# Patient Record
Sex: FEMALE | Race: WHITE | NOT HISPANIC OR LATINO | Employment: FULL TIME | URBAN - METROPOLITAN AREA
[De-identification: names, ages, dates, MRNs, and addresses within clinical notes are randomized per-mention and may not be internally consistent; named-entity substitution may affect disease eponyms.]

---

## 2023-07-26 ENCOUNTER — OFFICE VISIT (OUTPATIENT)
Dept: PHYSICAL MEDICINE AND REHAB | Facility: CLINIC | Age: 53
End: 2023-07-26
Payer: COMMERCIAL

## 2023-07-26 VITALS — SYSTOLIC BLOOD PRESSURE: 141 MMHG | HEART RATE: 78 BPM | DIASTOLIC BLOOD PRESSURE: 93 MMHG

## 2023-07-26 DIAGNOSIS — S06.0XAA CONCUSSION WITH UNKNOWN LOSS OF CONSCIOUSNESS STATUS, INITIAL ENCOUNTER: Primary | ICD-10-CM

## 2023-07-26 DIAGNOSIS — Z01.30 BLOOD PRESSURE CHECK: ICD-10-CM

## 2023-07-26 PROCEDURE — 99417 PROLNG OP E/M EACH 15 MIN: CPT | Performed by: PHYSICAL MEDICINE & REHABILITATION

## 2023-07-26 PROCEDURE — 99205 OFFICE O/P NEW HI 60 MIN: CPT | Performed by: PHYSICAL MEDICINE & REHABILITATION

## 2023-07-26 RX ORDER — PAROXETINE 30 MG/1
30 TABLET, FILM COATED ORAL
COMMUNITY
Start: 2023-06-27 | End: 2023-10-27
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
## 2023-08-07 ENCOUNTER — OFFICE VISIT (OUTPATIENT)
Dept: FAMILY MEDICINE | Facility: CLINIC | Age: 53
End: 2023-08-07
Payer: COMMERCIAL

## 2023-08-07 ENCOUNTER — ANCILLARY PROCEDURE (OUTPATIENT)
Dept: GENERAL RADIOLOGY | Facility: CLINIC | Age: 53
End: 2023-08-07
Attending: NURSE PRACTITIONER
Payer: COMMERCIAL

## 2023-08-07 VITALS
SYSTOLIC BLOOD PRESSURE: 130 MMHG | TEMPERATURE: 98.9 F | OXYGEN SATURATION: 96 % | DIASTOLIC BLOOD PRESSURE: 87 MMHG | HEART RATE: 73 BPM | BODY MASS INDEX: 34.99 KG/M2 | WEIGHT: 210 LBS | HEIGHT: 65 IN

## 2023-08-07 DIAGNOSIS — M89.8X6 PAIN IN LEFT TIBIA: ICD-10-CM

## 2023-08-07 DIAGNOSIS — L08.9 LOCAL INFECTION OF SKIN AND SUBCUTANEOUS TISSUE: Primary | ICD-10-CM

## 2023-08-07 PROCEDURE — 73590 X-RAY EXAM OF LOWER LEG: CPT | Mod: LT | Performed by: RADIOLOGY

## 2023-08-07 RX ORDER — DOXYCYCLINE HYCLATE 100 MG
100 TABLET ORAL 2 TIMES DAILY
Qty: 20 TABLET | Refills: 0 | Status: SHIPPED | OUTPATIENT
Start: 2023-08-07 | End: 2023-08-17

## 2023-08-07 ASSESSMENT — ENCOUNTER SYMPTOMS
CHILLS: 0
FEVER: 0
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
## 2023-08-08 ENCOUNTER — VIRTUAL VISIT (OUTPATIENT)
Dept: PHYSICAL MEDICINE AND REHAB | Facility: CLINIC | Age: 53
End: 2023-08-08
Payer: COMMERCIAL

## 2023-08-08 DIAGNOSIS — S06.0XAD CONCUSSION WITH UNKNOWN LOSS OF CONSCIOUSNESS STATUS, SUBSEQUENT ENCOUNTER: Primary | ICD-10-CM

## 2023-08-08 PROCEDURE — 99215 OFFICE O/P EST HI 40 MIN: CPT | Mod: VID | Performed by: PHYSICAL MEDICINE & REHABILITATION

## 2023-08-08 PROCEDURE — 99417 PROLNG OP E/M EACH 15 MIN: CPT | Mod: VID | Performed by: PHYSICAL MEDICINE & REHABILITATION
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
## 2023-08-08 NOTE — PROGRESS NOTES
"Virtual Visit Details    Type of service:  Video Visit     Originating Location (pt. Location): {video visit patient location:659995::\"Home\"}  {PROVIDER LOCATION On-site should be selected for visits conducted from your clinic location or adjoining [facility] hospital, academic office, or other nearby [facility] building. Off-site should be selected for all other provider locations, including home:807585}  Distant Location (provider location):  {virtual location provider:477106}  Platform used for Video Visit: {Virtual Visit Platforms:219330::\"Critical Diagnostics\"}    "

## 2023-08-08 NOTE — PROGRESS NOTES
Virtual Visit Details    Type of service:  Video Visit     Originating Location (pt. Location): Home    Distant Location (provider location):  On-site  Platform used for Video Visit: Beau

## 2023-08-08 NOTE — NURSING NOTE
Patient denies any changes since echeck-in regarding medication and allergies and states all information entered during echeck-in remains accurate.    Is the patient currently in the state of [place]? YES    Visit mode:VIDEO    If the visit is dropped, the patient can be reconnected by: VIDEO VISIT: Text to cell phone: [phone]    Will anyone else be joining the visit? NO      How would you like to obtain your AVS? MyChart    Are changes needed to the allergy or medication list? NO    Reason for visit: RECHECK       Alert and oriented to person, place and time

## 2023-09-16 ENCOUNTER — HEALTH MAINTENANCE LETTER (OUTPATIENT)
Age: 53
End: 2023-09-16

## 2023-09-24 ASSESSMENT — PATIENT HEALTH QUESTIONNAIRE - PHQ9
SUM OF ALL RESPONSES TO PHQ QUESTIONS 1-9: 4
SUM OF ALL RESPONSES TO PHQ QUESTIONS 1-9: 4
10. IF YOU CHECKED OFF ANY PROBLEMS, HOW DIFFICULT HAVE THESE PROBLEMS MADE IT FOR YOU TO DO YOUR WORK, TAKE CARE OF THINGS AT HOME, OR GET ALONG WITH OTHER PEOPLE: SOMEWHAT DIFFICULT

## 2023-09-24 ASSESSMENT — ANXIETY QUESTIONNAIRES
3. WORRYING TOO MUCH ABOUT DIFFERENT THINGS: NOT AT ALL
2. NOT BEING ABLE TO STOP OR CONTROL WORRYING: NOT AT ALL
5. BEING SO RESTLESS THAT IT IS HARD TO SIT STILL: SEVERAL DAYS
IF YOU CHECKED OFF ANY PROBLEMS ON THIS QUESTIONNAIRE, HOW DIFFICULT HAVE THESE PROBLEMS MADE IT FOR YOU TO DO YOUR WORK, TAKE CARE OF THINGS AT HOME, OR GET ALONG WITH OTHER PEOPLE: SOMEWHAT DIFFICULT
1. FEELING NERVOUS, ANXIOUS, OR ON EDGE: MORE THAN HALF THE DAYS
GAD7 TOTAL SCORE: 7
7. FEELING AFRAID AS IF SOMETHING AWFUL MIGHT HAPPEN: NOT AT ALL
4. TROUBLE RELAXING: SEVERAL DAYS
6. BECOMING EASILY ANNOYED OR IRRITABLE: NEARLY EVERY DAY
GAD7 TOTAL SCORE: 7

## 2023-09-24 ASSESSMENT — ENCOUNTER SYMPTOMS
BREAST MASS: 0
FREQUENCY: 0
JOINT SWELLING: 0
ABDOMINAL PAIN: 0
DIZZINESS: 0
MYALGIAS: 1
HEADACHES: 1
EYE PAIN: 0
PARESTHESIAS: 0
FEVER: 0
ARTHRALGIAS: 1
PALPITATIONS: 0
DIARRHEA: 0
SORE THROAT: 0
CHILLS: 0
HEMATURIA: 0
HEARTBURN: 0
SHORTNESS OF BREATH: 0
WEAKNESS: 0
NERVOUS/ANXIOUS: 1
HEMATOCHEZIA: 0
DYSURIA: 0
CONSTIPATION: 0
NAUSEA: 0
COUGH: 0

## 2023-09-25 ENCOUNTER — OFFICE VISIT (OUTPATIENT)
Dept: FAMILY MEDICINE | Facility: CLINIC | Age: 53
End: 2023-09-25
Payer: COMMERCIAL

## 2023-09-25 VITALS
HEART RATE: 73 BPM | OXYGEN SATURATION: 96 % | HEIGHT: 64 IN | BODY MASS INDEX: 35.85 KG/M2 | WEIGHT: 210 LBS | DIASTOLIC BLOOD PRESSURE: 85 MMHG | SYSTOLIC BLOOD PRESSURE: 126 MMHG | TEMPERATURE: 98.9 F

## 2023-09-25 DIAGNOSIS — F41.9 ANXIETY AND DEPRESSION: ICD-10-CM

## 2023-09-25 DIAGNOSIS — Z23 ENCOUNTER FOR IMMUNIZATION: ICD-10-CM

## 2023-09-25 DIAGNOSIS — R10.11 RUQ ABDOMINAL PAIN: ICD-10-CM

## 2023-09-25 DIAGNOSIS — Z12.31 ENCOUNTER FOR SCREENING MAMMOGRAM FOR BREAST CANCER: ICD-10-CM

## 2023-09-25 DIAGNOSIS — E66.9 OBESITY WITH BODY MASS INDEX OF 30.0-39.9: ICD-10-CM

## 2023-09-25 DIAGNOSIS — Z00.00 HEALTHCARE MAINTENANCE: Primary | ICD-10-CM

## 2023-09-25 DIAGNOSIS — H74.01 TYMPANOSCLEROSIS OF RIGHT EAR: ICD-10-CM

## 2023-09-25 DIAGNOSIS — F32.A ANXIETY AND DEPRESSION: ICD-10-CM

## 2023-09-25 ASSESSMENT — ENCOUNTER SYMPTOMS
EYE PAIN: 0
PARESTHESIAS: 0
HEARTBURN: 0
CHILLS: 0
WEAKNESS: 0
SORE THROAT: 0
HEMATOCHEZIA: 0
FEVER: 0
DIZZINESS: 0
HEMATURIA: 0
MYALGIAS: 1
ABDOMINAL PAIN: 0
NAUSEA: 0
NERVOUS/ANXIOUS: 1
COUGH: 0
BREAST MASS: 0
CONSTIPATION: 0
DIARRHEA: 0
HEADACHES: 1
PALPITATIONS: 0
JOINT SWELLING: 0
DYSURIA: 0
SHORTNESS OF BREATH: 0
FREQUENCY: 0
ARTHRALGIAS: 1

## 2023-09-25 NOTE — NURSING NOTE
"ROOM:2  KEENA RING    Preferred Name: Steve     How did you hear about us?  Other - Call Center    53 year old  Chief Complaint   Patient presents with     Physical       Blood pressure 126/85, pulse 73, temperature 98.9  F (37.2  C), temperature source Oral, height 1.631 m (5' 4.2\"), weight 95.3 kg (210 lb), SpO2 96 %. Body mass index is 35.82 kg/m .  BP completed using cuff size:        Patient Active Problem List   Diagnosis   (none) - all problems resolved or deleted       Wt Readings from Last 2 Encounters:   09/25/23 95.3 kg (210 lb)   08/07/23 95.3 kg (210 lb)     BP Readings from Last 3 Encounters:   09/25/23 126/85   08/07/23 130/87   07/26/23 (!) 141/93       Allergies   Allergen Reactions     Erythromycin Base [Erythromycin] Nausea and Vomiting     Sulfa (Sulfonamide Antibiotics) [Sulfa Antibiotics] Swelling       Current Outpatient Medications   Medication     PARoxetine (PAXIL) 30 MG tablet     No current facility-administered medications for this visit.       Social History     Tobacco Use     Smoking status: Never     Smokeless tobacco: Never       Honoring Choices - Health Care Directive Guide offered to patient at time of visit.    Health Maintenance Due   Topic Date Due     ADVANCE CARE PLANNING  Never done     MAMMO SCREENING  Never done     HEPATITIS B IMMUNIZATION (1 of 3 - 3-dose series) Never done     COLORECTAL CANCER SCREENING  Never done     HIV SCREENING  Never done     HEPATITIS C SCREENING  Never done     PAP  07/29/2012     LIPID  Never done     YEARLY PREVENTIVE VISIT  06/02/2023     INFLUENZA VACCINE (1) 09/01/2023     ZOSTER IMMUNIZATION (2 of 2) 01/17/2023       Immunization History   Administered Date(s) Administered     COVID-19 Bivalent 12+ (Pfizer) 09/27/2022     COVID-19 Monovalent 18+ (Moderna) 02/25/2021, 03/25/2021, 11/24/2021     Influenza (H1N1) 11/13/2009       No results found for: PAP    No lab results found.        8/8/2023     3:06 PM 7/26/2023     3:38 PM "   PHQ-2 ( 1999 Pfizer)   Q1: Little interest or pleasure in doing things 0 1   Q2: Feeling down, depressed or hopeless 0 0   PHQ-2 Score 0 1           9/24/2023     9:48 AM   PHQ-9 SCORE   PHQ-9 Total Score MyChart 4 (Minimal depression)   PHQ-9 Total Score 4           9/24/2023     9:49 AM   SILVIA-7 SCORE   Total Score 7 (mild anxiety)   Total Score 7            No data to display                Wilfredo Dutta    September 25, 2023 9:18 AM

## 2023-09-25 NOTE — PROGRESS NOTES
"     ANNUAL WELLNESS EXAM     Today's Date: Sep 25, 2023     Patient Steve Aggarwal 1970 presents to the clinic today for a preventative health visit.         SUBJECTIVE     History of Present Illness:      53-year-old female with past medical history NURY (on CPAP) presents for annual physical and discuss a few concerns.    Right upper quadrant pain-patient reports ongoing chronic right upper quadrant abdominal pain.  Chart review demonstrates a thorough work-up for this on 2/7/2023 through her provider at [facility].  She had labs, CT abdomen/pelvis, and a right upper quadrant ultrasound.  Ultrasound demonstrated cholelithiasis and fatty infiltration of liver.  Today, patient reports that she will have the pain daily.  She has started a \"digestive enzyme\" by her chiropractor which she thinks is helping.  She denies frequent nausea.  She reports that she never feels satiated and has a weight gain of over 15 pounds.  She has had episodes of vomiting with BMs.  She also reports chronic hemorrhoids with intermittent bleeding.  The bleeding will resolve after a few days. Her last bleeding episode was during her recent trip to [facility] last month.  She will use an over-the-counter cream for her hemorrhoid.  She has a chronic history of constipation which she manages with Metamucil.  She has believe that her GI issues could be related to stress.     Anxiety-patient currently on Paxil 30 mg.  She has been on this medication for a long time and feels that helps her anxiety however she still feels depressed. No other acute concerns/symptoms at time of exam.                  Allergies   Allergen Reactions    Erythromycin Base [Erythromycin] Nausea and Vomiting    Sulfa (Sulfonamide Antibiotics) [Sulfa Antibiotics] Swelling        Current Outpatient Medications   Medication Instructions    PARoxetine (PAXIL) 30 mg, Oral       No past medical history on file.     No family history on file.       Social History     Tobacco Use "    Smoking status: Never    Smokeless tobacco: Never        History   Sexual Activity    Sexual activity: Not on file             9/24/2023     9:48 AM   PHQ   PHQ-9 Total Score 4   Q9: Thoughts of better off dead/self-harm past 2 weeks Not at all        Immunization History   Administered Date(s) Administered    COVID-19 Bivalent 12+ (Pfizer) 09/27/2022    COVID-19 Monovalent 18+ (Moderna) 02/25/2021, 03/25/2021, 11/24/2021    Influenza (H1N1) 11/13/2009        Health Maintenance Due   Topic Date Due    ADVANCE CARE PLANNING  Never done    MAMMO SCREENING  Never done    HEPATITIS B IMMUNIZATION (1 of 3 - 3-dose series) Never done    COLORECTAL CANCER SCREENING  Never done    HIV SCREENING  Never done    HEPATITIS C SCREENING  Never done    PAP  07/29/2012    LIPID  Never done    YEARLY PREVENTIVE VISIT  06/02/2023    INFLUENZA VACCINE (1) 09/01/2023    ZOSTER IMMUNIZATION (2 of 2) 01/17/2023      Health Maintenance components reviewed -  Pap- May,11 2021- NIL  Colonoscopy- 2021- Repeat 2026  Mammogram- 11/18/2022- Negative          Review of Systems   Constitutional:  Negative for chills and fever.   HENT:  Negative for congestion, ear pain, hearing loss and sore throat.    Eyes:  Negative for pain and visual disturbance.   Respiratory:  Negative for cough and shortness of breath.    Cardiovascular:  Negative for chest pain, palpitations and peripheral edema.   Gastrointestinal:  Negative for abdominal pain, constipation, diarrhea, heartburn, hematochezia and nausea.   Breasts:  Negative for tenderness, breast mass and discharge.   Genitourinary:  Negative for dysuria, frequency, genital sores, hematuria, pelvic pain, urgency, vaginal bleeding and vaginal discharge.   Musculoskeletal:  Positive for arthralgias and myalgias. Negative for joint swelling.   Skin:  Negative for rash.   Neurological:  Positive for headaches. Negative for dizziness, weakness and paresthesias.   Psychiatric/Behavioral:  Positive for mood  "changes. The patient is nervous/anxious.    Review of Systems   Constitutional, HEENT, cardiovascular, pulmonary, gi and gu systems are negative, except as otherwise noted.           OBJECTIVE     /85   Pulse 73   Temp 98.9  F (37.2  C) (Oral)   Ht 1.631 m (5' 4.2\")   Wt 95.3 kg (210 lb)   SpO2 96%   BMI 35.82 kg/m         Estimated body mass index is 35.82 kg/m  as calculated from the following:    Height as of this encounter: 1.631 m (5' 4.2\").    Weight as of this encounter: 95.3 kg (210 lb).        Labs:  No results found for: WBC, HGB, HCT, PLT, CHOL, TRIG, HDL, LDLDIRECT, ALT, AST, NA, CREATININE, BUN, CO2, TSH, PSA, INR, GLUF, HGBA1C, MICROALBUR    Physical Exam  Exam conducted with a chaperone present.   Constitutional:       General: She is not in acute distress.     Appearance: She is not ill-appearing.   HENT:      Right Ear: Tympanic membrane is scarred.      Left Ear: Tympanic membrane normal.      Mouth/Throat:      Mouth: Mucous membranes are moist.      Pharynx: No oropharyngeal exudate.   Eyes:      Extraocular Movements: Extraocular movements intact.      Pupils: Pupils are equal, round, and reactive to light.   Cardiovascular:      Rate and Rhythm: Normal rate and regular rhythm.      Heart sounds: Normal heart sounds. No murmur heard.  Pulmonary:      Effort: Pulmonary effort is normal. No respiratory distress.      Breath sounds: Normal breath sounds. No wheezing or rales.   Chest:   Breasts:     Right: No mass.      Left: No mass.   Abdominal:      General: Bowel sounds are normal.      Palpations: Abdomen is soft.      Tenderness: There is no abdominal tenderness. Negative signs include Barone's sign.   Genitourinary:     Rectum: External hemorrhoid present.      Comments: On inspection, non-bleeding external hemorroid noted.  Musculoskeletal:      Cervical back: Neck supple.      Right lower leg: No edema.      Left lower leg: No edema.   Lymphadenopathy:      Cervical: No " cervical adenopathy.      Upper Body:      Right upper body: No axillary adenopathy.      Left upper body: No axillary adenopathy.   Skin:     General: Skin is warm.   Neurological:      General: No focal deficit present.      Mental Status: She is alert.   Psychiatric:         Thought Content: Thought content normal.         Judgment: Judgment normal.          Dinorah Wen, EMT served as chaperone.         ASSESSMENT/PLAN       1. Healthcare maintenance  Colonoscopy-repeat 2026  Pap-through OB/GYN, 2021 NIL repeat 2024.  Mammogram-due 11/2023    2. RUQ abdominal pain  59-year-old female with chronic right upper quadrant abdominal pain.  Work-up last winter demonstrated cholelithiasis and fatty infiltration of liver.  Her abdomen is currently soft, normal bowel sounds, and negative Barone sign.  She has restarted taking digestive enzymes to her chiropractor where she believes is helping.  Should her symptoms escalate, may be worth having her see general surgery discuss removal of gallbladder.    For external hemorrhoid, advised she can use over-the-counter hydrocortisone cream for for no more than 1 week.  She can also try Colace to help with constipation.  - CBC with platelets differential; Future  - Comprehensive metabolic panel; Future    3. Anxiety and depression  We discussed adding new medication versus starting therapy.  Patient opted to start therapy at this time.  - Adult Mental Health  Referral; Future    4. Encounter for screening mammogram for breast cancer  - Screening Mammogram Digital Bilateral; Future    5. Obesity with body mass index of 30.0-39.9  Her weight is stable when compared to 8/7/23. We discussed GLP-1s considering her satiety concerns.  Patient denies history of medullary thyroid cancer, multiple endocrine neoplasia syndrome type II, or pancreatitis.  We will check labs noted below and refer to our weight management clinic for further evaluation/treatment.  - Adult  Comprehensive Weight Management  Referral; Future  - Lipid panel reflex to direct LDL Fasting; Future  - Hemoglobin A1c; Future  - TSH with free T4 reflex; Future    6. Encounter for immunization  - INFLUENZA VACCINE >6 MONTHS (AFLURIA/FLUZONE)    7. Tympanosclerosis of right ear  Patient denied hearing concerns.  Should she develop concerns, will refer to audiology.      -Discussed/Reinforced healthy diety, lifestyle, exercise and safety.  -Recommended completion of routine dental and eye exam.  -Lab screenings completed today. Results pending.     All questions/concerns addressed. Patient stated understanding/agreement to plan of care.      Follow-Up:  Follow up in one year, or sooner if needed.     Patient engaged in their plan of care. Patient verbalized understanding and agreed with the final plan.  AVS printed and given to patient.    BETO Blackwood [facility]  [address] [place] [id number]  [phone]    Note: Chart documentation was done in part with Dragon Voice Recognition software.  Although reviewed after completion, some word and grammatical errors may remain. Please contact author for any clarification or concerns.          Answers submitted by the patient for this visit:  Patient Health Questionnaire (Submitted on 9/24/2023)  If you checked off any problems, how difficult have these problems made it for you to do your work, take care of things at home, or get along with other people?: Somewhat difficult  PHQ9 TOTAL SCORE: 4  SILVIA-7 (Submitted on 9/24/2023)  SILVIA 7 TOTAL SCORE: 7  Annual Preventive Visit (Submitted on 9/24/2023)  Chief Complaint: Annual Exam:  Frequency of exercise:: 2-3 days/week  Getting at least 3 servings of Calcium per day:: NO  Diet:: Other  Taking medications regularly:: Yes  Medication side effects:: Not applicable  Bi-annual eye exam:: Yes  Dental care twice a year:: Yes  Sleep apnea or  symptoms of sleep apnea:: Sleep apnea  Additional concerns today:: Yes  Exercise outside of work (Submitted on 9/24/2023)  Chief Complaint: Annual Exam:  Duration of exercise:: 15-30 minutes

## 2023-10-05 ENCOUNTER — LAB (OUTPATIENT)
Dept: LAB | Facility: CLINIC | Age: 53
End: 2023-10-05
Payer: COMMERCIAL

## 2023-10-05 DIAGNOSIS — R10.11 RUQ ABDOMINAL PAIN: ICD-10-CM

## 2023-10-05 DIAGNOSIS — E66.9 OBESITY WITH BODY MASS INDEX OF 30.0-39.9: ICD-10-CM

## 2023-10-05 LAB
ALBUMIN SERPL BCG-MCNC: 4.3 G/DL (ref 3.5–5.2)
ALP SERPL-CCNC: 105 U/L (ref 35–104)
ALT SERPL W P-5'-P-CCNC: 18 U/L (ref 0–50)
ANION GAP SERPL CALCULATED.3IONS-SCNC: 11 MMOL/L (ref 7–15)
AST SERPL W P-5'-P-CCNC: 21 U/L (ref 0–45)
BASO+EOS+MONOS # BLD AUTO: NORMAL 10*3/UL
BASO+EOS+MONOS NFR BLD AUTO: NORMAL %
BASOPHILS # BLD AUTO: 0.1 10E3/UL (ref 0–0.2)
BASOPHILS NFR BLD AUTO: 1 %
BILIRUB SERPL-MCNC: 0.4 MG/DL
BUN SERPL-MCNC: 11.4 MG/DL (ref 6–20)
CALCIUM SERPL-MCNC: 9.5 MG/DL (ref 8.6–10)
CHLORIDE SERPL-SCNC: 105 MMOL/L (ref 98–107)
CHOLEST SERPL-MCNC: 194 MG/DL
CREAT SERPL-MCNC: 0.82 MG/DL (ref 0.51–0.95)
DEPRECATED HCO3 PLAS-SCNC: 26 MMOL/L (ref 22–29)
EGFRCR SERPLBLD CKD-EPI 2021: 86 ML/MIN/1.73M2
EOSINOPHIL # BLD AUTO: 0.1 10E3/UL (ref 0–0.7)
EOSINOPHIL NFR BLD AUTO: 2 %
ERYTHROCYTE [DISTWIDTH] IN BLOOD BY AUTOMATED COUNT: 13.4 % (ref 10–15)
ESTRADIOL SERPL-MCNC: 12 PG/ML
FSH SERPL IRP2-ACNC: 63.1 MIU/ML
GLUCOSE SERPL-MCNC: 110 MG/DL (ref 70–99)
HBA1C MFR BLD: 6.2 % (ref 0–5.6)
HCT VFR BLD AUTO: 40.7 % (ref 35–47)
HDLC SERPL-MCNC: 44 MG/DL
HGB BLD-MCNC: 13.6 G/DL (ref 11.7–15.7)
IMM GRANULOCYTES # BLD: 0 10E3/UL
IMM GRANULOCYTES NFR BLD: 1 %
LDLC SERPL CALC-MCNC: 129 MG/DL
LH SERPL-ACNC: 31.3 MIU/ML
LYMPHOCYTES # BLD AUTO: 2.1 10E3/UL (ref 0.8–5.3)
LYMPHOCYTES NFR BLD AUTO: 34 %
MCH RBC QN AUTO: 29.6 PG (ref 26.5–33)
MCHC RBC AUTO-ENTMCNC: 33.4 G/DL (ref 31.5–36.5)
MCV RBC AUTO: 89 FL (ref 78–100)
MONOCYTES # BLD AUTO: 0.5 10E3/UL (ref 0–1.3)
MONOCYTES NFR BLD AUTO: 8 %
NEUTROPHILS # BLD AUTO: 3.3 10E3/UL (ref 1.6–8.3)
NEUTROPHILS NFR BLD AUTO: 55 %
NONHDLC SERPL-MCNC: 150 MG/DL
PLATELET # BLD AUTO: 207 10E3/UL (ref 150–450)
POTASSIUM SERPL-SCNC: 4.1 MMOL/L (ref 3.4–5.3)
PROGEST SERPL-MCNC: 0.5 NG/ML
PROT SERPL-MCNC: 6.6 G/DL (ref 6.4–8.3)
RBC # BLD AUTO: 4.6 10E6/UL (ref 3.8–5.2)
SODIUM SERPL-SCNC: 142 MMOL/L (ref 135–145)
TRIGL SERPL-MCNC: 103 MG/DL
TSH SERPL DL<=0.005 MIU/L-ACNC: 3.63 UIU/ML (ref 0.3–4.2)
WBC # BLD AUTO: 6.1 10E3/UL (ref 4–11)

## 2023-10-05 PROCEDURE — 82670 ASSAY OF TOTAL ESTRADIOL: CPT

## 2023-10-05 PROCEDURE — 36415 COLL VENOUS BLD VENIPUNCTURE: CPT

## 2023-10-05 PROCEDURE — 83036 HEMOGLOBIN GLYCOSYLATED A1C: CPT

## 2023-10-05 PROCEDURE — 84443 ASSAY THYROID STIM HORMONE: CPT

## 2023-10-05 PROCEDURE — 85025 COMPLETE CBC W/AUTO DIFF WBC: CPT

## 2023-10-05 PROCEDURE — 83002 ASSAY OF GONADOTROPIN (LH): CPT

## 2023-10-05 PROCEDURE — 80061 LIPID PANEL: CPT

## 2023-10-05 PROCEDURE — 80053 COMPREHEN METABOLIC PANEL: CPT

## 2023-10-05 PROCEDURE — 84144 ASSAY OF PROGESTERONE: CPT

## 2023-10-05 PROCEDURE — 83001 ASSAY OF GONADOTROPIN (FSH): CPT

## 2023-10-10 NOTE — TELEPHONE ENCOUNTER
REFERRAL INFORMATION:  Referring Provider: Juice Roberts NP  Referring Clinic: [facility]  Reason for Visit/Diagnosis: Gallbladder       FUTURE VISIT INFORMATION:  Appointment Date: 10/12/2023  Appointment Time: 2:30 PM     NOTES RECORD STATUS  DETAILS   OFFICE NOTE from Referring Provider Internal [facility]:  9/25/23 - PCC OV with Juice Roberts NP   OFFICE NOTE from Other Specialists Care Everywhere [facility]:  2/7/23 - PCC OV with Dr. Galvan  6/2/22 - OBGYN OV with Dr. Thomas   HOSPITAL DISCHARGE SUMMARY/ ED VISITS  N/A    OPERATIVE REPORT N/A    PERTINENT LABS Care Everywhere / Internal    IMAGING (CT, MRI, US, XR)  Received [facility]:  7/8/23 - XR Chest    [facility]:  2/10/23 - US Abdomen  2/8/23 - CT Abd/Pelvis  2/7/23, 3/31/21 - XR Abdomen  7/30/21 - XR Chest     Records Requested    Facility  [facility]  Fax: [phone]  [facility]  Fax: [phone]   Outcome * 10/10/23 12:30 PM Faxed urg req to [facility] and [facility] for images to be pushed to [place] PACs. - Carly    * 10/11/23 7:54 AM Images received from [facility] and [facility] and attached to the patient in PACs. - Carly

## 2023-10-12 ENCOUNTER — VIRTUAL VISIT (OUTPATIENT)
Dept: SURGERY | Facility: CLINIC | Age: 53
End: 2023-10-12
Attending: NURSE PRACTITIONER
Payer: COMMERCIAL

## 2023-10-12 ENCOUNTER — PRE VISIT (OUTPATIENT)
Dept: SURGERY | Facility: CLINIC | Age: 53
End: 2023-10-12

## 2023-10-12 VITALS — BODY MASS INDEX: 35.68 KG/M2 | WEIGHT: 209 LBS | HEIGHT: 64 IN

## 2023-10-12 DIAGNOSIS — K80.20 GALLSTONES: Primary | ICD-10-CM

## 2023-10-12 PROCEDURE — 99203 OFFICE O/P NEW LOW 30 MIN: CPT | Mod: VID | Performed by: SURGERY

## 2023-10-12 ASSESSMENT — PAIN SCALES - GENERAL: PAINLEVEL: MILD PAIN (3)

## 2023-10-12 NOTE — PATIENT INSTRUCTIONS
You met with Dr. Eber Foy.      Today's visit instructions:    Reminder:  Surgery Requirements  Your surgery will be at [facility] [place]- 5th Floor.  You will need to arrive 1.5  hours early.  You will need someone to drive you home (over 18 years old) and stay with you for 24 hours after the procedure.  You will need a preop physical with your regular doctor within 30 days of surgery- closer is always better.  Stop any blood thinners, vitamins, minerals, or herbal supplements 5 days before surgery.  If you are taking a prescribed blood thinner or weight loss medication please let us know for specific instructions.  Fasting- a nurse from [facility] will call you 1-2 days before surgery to confirm your procedure and tell you when to stop eating and drinking.   Wash with the soap (Antibacterial Dial Foaming Complete , Hibiclense, or soap given/mailed from the clinic) the night before surgery and morning of surgery. See instructions in the Surgery Packet.  If you would like a procedure estimate please call Cost of Care at [phone] during the hours of 8 AM - 3 PM (option #2 for on-line request and option #3 for a representative).     If you have questions please contact Shivani RN or Tamika RN during regular clinic hours, Monday through Friday 7:30 AM - 4:00 PM, or you can contact us via [employer] at anytime.       If you have urgent needs after-hours, weekends, or holidays please call the hospital at [phone] and ask to speak with our on-call General Surgery Team.    Appointment [phone]  Nurse Advice (Shivani or Tamika): [phone]   Surgery Scheduler (Michelle): [phone]  Fax: [phone]    After your Robotic Cholecystectomy          Incision care   You may take a shower the day after surgery. Carefully wash your incision with soap and water. Do not submerge yourself in water (bath, whirlpool, hot tub, pool, lake) for 14 days after surgery.   Remove the  bandage the day after surgery, but leave the medical tape (Steri-Strips) or glue in place. These will loosen and fall off on their own 1-2 weeks after surgery.     Always wash your hands before touching your incisions or removing bandages.   It is not unusual to form a collection of fluid or blood under your incision that may feel firm or squishy- it can take several weeks to months for your body to reabsorb it.  At times, it may even drain.  If that should happen keep the area clean with soap, water,  and cover with a clean gauze dressing. You can change this daily or as needed.       Other medicines   Wait to start aspirin or blood thinners until the day after surgery. You can continue your regular medicines at your normal time the day after surgery.    Your pain medicine may cause constipation (hard, dry stools). To help with this, take the stool softener your doctor gave you or an over-the-counter stool softener or laxative. You can stop taking this when you are no longer taking pain medicine and your bowel movements are back to normal.      For pain or discomfort   Take the narcotic pain medicine your doctor gave you as needed and as instructed on the bottle. If you prefer to use over-the-counter medication, use acetaminophen (Tylenol) or ibuprofen (Advil, Motrin) as instructed on the box. Do not take Tylenol if it is in your narcotic pain medication.   Use an ice pack on your abdomen (belly) for 20 minutes at a time as needed for the first 24 hours. Be sure to protect your skin by putting a cloth between the ice pack and your skin.   After 24 hours you can switch to heat for 20 minutes as needed. Be sure to protect your skin by putting a cloth between the heat pack and your skin.   You may experience right shoulder pain after surgery which will go away 1-4 days after your procedure.  This is related to the gas that was used to inflate your abdomen, it gets trapped between your liver and diaphragm.  Walk  frequently and apply a heating pad (protecting your skin from the heating pad with a barrier such as a towel).       Activities   No driving until you feel it s safe to do so. Don t drive while taking narcotic pain medicine.   Don t lift anything heavier than 20 pounds for 3 to 4 weeks after surgery.      Special equipment   None     Diet   You can eat your regular meals after surgery.      When to call the doctor   Call your doctor if you have:   A fever above 101 F (38.3 C) (taken under the tongue), or a fever or chills lasting more than a day.   Redness at the incision site.   Any fluid or blood draining from the incision, especially if it smells bad.    Severe pain that doesn t improve with pain medicine.        We will call you 2 to 4 days after surgery to review this handout, answer questions and help arrange after-surgery care. If you have questions or concerns, please call [phone] during regular office hours. If you need to call after business hours, call [phone] and ask to page the surgeon on-call.     IMPORTANT:  Prior to your surgical procedure, a nurse will be contacting you to obtain a health history.   If they do not reach you by noon the day prior to your surgery, your surgery will be cancelled. If you have your Pre-Op Surgical Physical (H&P) with the Anesthesia Team (PAC), you are exempt from this call. Phone:  [phone] ([facility]) or [phone] ([place]).                Transversus Abdominis Plane (TAP) Pain Block      What is a TAP block?   A TAP block can help you manage your pain after surgery. TAP stands for transversus abdominis plane, which is a muscle layer in your abdomen (belly). The TAP block uses numbing medicine similar to Novocaine to block pain near the site of your surgery.       Why get a TAP block?   To better manage your pain after surgery. A tap block will help keep the pain from getting severe and out of control.   To block pain signals from the nerve, which helps  decrease pain after surgery.   To help you sleep, easily breathe deeply, walk and visit with others.      How is it done?   You will lie still on a table. We will use an ultrasound machine to help us see the correct muscle layer of your abdomen. Then, we ll use a needle to inject the medicine. We may also give you some sleep medicine to lessen the pain of the injection.       The procedure takes between 5 and 15 minutes. It is usually done right before surgery, but will sometimes be after. It depends on your surgery and care needs.      What can I expect?   You may feel numbness, tingling or a heaviness in your abdomen.    You may have pain control up to 72 hours after surgery.   The TAP block may not lessen all of your surgery pain. But most patients feel 50 to 75 percent less pain than without the block.       Tell your nurse if you have:   Numbness or tingling in areas other than where the injection was   Blurry vision   Ringing in your ears   A metallic taste in your mouth

## 2023-10-12 NOTE — LETTER
10/12/2023       RE: Steve Aggarwal  [address]  [place] [address]     Dear Colleague,    Thank you for referring your patient, Steve Aggarwal, to the [facility] [place] [facility]. Please see a copy of my visit note below.    Video visit:    Steve Aggarwal is a 53 year old female with a 6 month history of abdominal pain localized to the right upper quadrant.  Symptoms are not associated with fatty food intake.  Associated symptoms: no  Common duct symptoms:  None  Diet tolerance:  good  Patient was not seen in the Emergency Room for these symptoms.  LFT's:  slight elevation.    Image studies included:  Ultrasound  Findings:  Gallstones seen    Past medical and surgical history, medications, allergies, family history, and social history were reviewed with the patient. Diverticulosis, under stress at work.    ROS: 10 point review of systems negative except noted in HPI  Impression:  Symptomatic cholelithiasis  Recommendation:  Laparoscopic Cholecystectomy robot assisted.     Low to nonfat diet until the patient undergoes surgery.    A full discussion regarding the alternatives, risks, goals, and potential complications for this surgery was completed today.  The patient understood that the potential problems included but are not limited to:  Infection, bleeding, bile leak, injury to structures about the gallbladder, possible common duct stone requiring further procedures. Most current review of literature confirm the more common specific risks related to laparoscopic cholecystectomy include bile duct injury (3/1000), bile leak (10/1000), retained common bile duct stone (10/1000), postcholecystectomy diarrhea (1-2%) and these complications may require additional treatment.      The patient verbally expressed understanding, was given the opportunity for questions, and at this point would like to follow watchful  waiting protocol on a low to non fat diet. Will contact us should she decide to proceed.      The total time spent with this patient was 30 minutes.  The total time was spent on the date of encounter doing chart review, history and physical, dressing changes, documentation, patient education, and any further activity as noted above.    Lovenox:  Yes          Again, thank you for allowing me to participate in the care of your patient.      Sincerely,    Eber Foy MD

## 2023-10-12 NOTE — NURSING NOTE
[employer] message sent to the patient with surgical logistics and post op teaching information. She was asked to contact this office if she would like to review this information over the phone.  Patient will contact the clinic if she would like to schedule surgery.

## 2023-10-12 NOTE — NURSING NOTE
"Chief Complaint   Patient presents with    Consult       Vitals:    10/12/23 1411   Weight: 209 lb   Height: 5' 4\"       Body mass index is 35.87 kg/m .    Jaspal ALLISON-P    "

## 2023-10-26 ENCOUNTER — MYC MEDICAL ADVICE (OUTPATIENT)
Dept: FAMILY MEDICINE | Facility: CLINIC | Age: 53
End: 2023-10-26

## 2023-10-26 DIAGNOSIS — F41.9 ANXIETY AND DEPRESSION: Primary | ICD-10-CM

## 2023-10-26 DIAGNOSIS — F32.A ANXIETY AND DEPRESSION: Primary | ICD-10-CM

## 2023-10-27 RX ORDER — PAROXETINE 30 MG/1
30 TABLET, FILM COATED ORAL EVERY MORNING
Qty: 90 TABLET | Refills: 1 | Status: SHIPPED | OUTPATIENT
Start: 2023-10-27 | End: 2024-05-31

## 2023-11-20 ENCOUNTER — TRANSFERRED RECORDS (OUTPATIENT)
Dept: HEALTH INFORMATION MANAGEMENT | Facility: CLINIC | Age: 53
End: 2023-11-20

## 2023-12-14 ENCOUNTER — MYC MEDICAL ADVICE (OUTPATIENT)
Dept: FAMILY MEDICINE | Facility: CLINIC | Age: 53
End: 2023-12-14

## 2023-12-14 DIAGNOSIS — H74.01 TYMPANOSCLEROSIS OF RIGHT EAR: Primary | ICD-10-CM

## 2023-12-28 ENCOUNTER — VIRTUAL VISIT (OUTPATIENT)
Dept: FAMILY MEDICINE | Facility: CLINIC | Age: 53
End: 2023-12-28
Payer: COMMERCIAL

## 2023-12-28 DIAGNOSIS — U07.1 INFECTION DUE TO 2019 NOVEL CORONAVIRUS: Primary | ICD-10-CM

## 2023-12-28 RX ORDER — FINASTERIDE 5 MG/1
TABLET, FILM COATED ORAL
COMMUNITY
Start: 2023-11-20

## 2023-12-28 ASSESSMENT — ENCOUNTER SYMPTOMS
SHORTNESS OF BREATH: 0
COUGH: 1
DIARRHEA: 0
VOMITING: 0
NAUSEA: 0

## 2023-12-28 NOTE — PATIENT INSTRUCTIONS
Call 911  anytime you think you may need emergency care. For example, call if you have life-threatening symptoms, such as:    You have severe trouble breathing. (You can't talk at all.)     You have constant chest pain or pressure.     You are severely dizzy or lightheaded.     You are confused or can't think clearly.     You have pale, gray, or blue-colored skin or lips.     You passed out (lost consciousness) or are very hard to wake up.   Call your doctor now or seek immediate medical care if:    You have moderate trouble breathing. (You can't speak a full sentence.)     You are coughing up blood.     You have signs of low blood pressure. These include feeling lightheaded; being too weak to stand; and having cold, pale, clammy skin.   Watch closely for changes in your health, and be sure to contact your doctor if:    Your symptoms get worse.     You are not getting better as expected.

## 2023-12-28 NOTE — PROGRESS NOTES
Steve is a 53 year old who is being evaluated via a billable video visit.      How would you like to obtain your AVS? MyChart  If the video visit is dropped, the invitation should be resent by: Text to cell phone: [phone]  Will anyone else be joining your video visit? No      Subjective   Steve is a 53 year old, presenting for the following health issues:  positive covid  (Next steps )      HPI    53-year-old female past medical history significant for NURY (on CPAP), anxiety/depression, prediabetes, and obesity presents to video visit to discuss COVID-19 infection.  Patient's symptoms started 2 days ago. Her current symptoms include generalized aches, cough with sputum, and possible fever (patient does not have a thermometer but feels warm).  The patient currently denies shortness of breath, chest pain, nausea, vomiting, or diarrhea.  She has taken Tylenol which helps with her aches.  She has had 4 doses of the COVID-vaccine.  She would like to discuss Paxlovid, however, she is hesitant as she is very sensitive to medications (she reports that she does not tolerate Tamiflu). No other acute concerns/symptoms at time of exam.            Review of Systems   HENT:  Positive for congestion.    Respiratory:  Positive for cough. Negative for shortness of breath.    Cardiovascular:  Negative for chest pain.   Gastrointestinal:  Negative for diarrhea, nausea and vomiting.     Constitutional, HEENT, cardiovascular, pulmonary, gi and gu systems are negative, except as otherwise noted.      Past Medical History:   Diagnosis Date    Anxiety and depression     NURY (obstructive sleep apnea)        Past Surgical History:   Procedure Laterality Date    HC REMOVE TONSILS/ADENOIDS,<13 Y/O      Description: Tonsillectomy With Adenoidectomy;  Recorded: 2009;    [facility] REMOVAL OF OVARY/TUBE(S)      Description: Salpingo-oophorectomy;  Recorded: 2009;    ZZC  DELIVERY ONLY      Description:  Section;   Recorded: 12/31/2009;       History reviewed. No pertinent family history.    Social History     Tobacco Use    Smoking status: Never    Smokeless tobacco: Never   Substance Use Topics    Alcohol use: Not on file     Current Outpatient Medications   Medication    finasteride (PROSCAR) 5 MG tablet    PARoxetine (PAXIL) 30 MG tablet     No current facility-administered medications for this visit.             Objective           Vitals:  No vitals were obtained today due to virtual visit.    Physical Exam   GENERAL: Healthy, alert and no distress  EYES: Eyes grossly normal to inspection.  No discharge or erythema, or obvious scleral/conjunctival abnormalities.  RESP: No audible wheeze, cough, or visible cyanosis.  No visible retractions or increased work of breathing.    SKIN: Visible skin clear. No significant rash, abnormal pigmentation or lesions.  NEURO: Cranial nerves grossly intact.  Mentation and speech appropriate for age.  PSYCH: Mentation appears normal, affect normal/bright, judgement and insight intact, normal speech and appearance well-groomed.      Lab on 10/05/2023   Component Date Value Ref Range Status    Cholesterol 10/05/2023 194  <200 mg/dL Final    Triglycerides 10/05/2023 103  <150 mg/dL Final    Direct Measure HDL 10/05/2023 44 (L)  >=50 mg/dL Final    LDL Cholesterol Calculated 10/05/2023 129 (H)  <=100 mg/dL Final    Non HDL Cholesterol 10/05/2023 150 (H)  <130 mg/dL Final    Hemoglobin A1C 10/05/2023 6.2 (H)  0.0 - 5.6 % Final    Normal <5.7%   Prediabetes 5.7-6.4%    Diabetes 6.5% or higher     Note: Adopted from ADA consensus guidelines.    Sodium 10/05/2023 142  135 - 145 mmol/L Final    Reference intervals for this test were updated on 09/26/2023 to more accurately reflect our healthy population. There may be differences in the flagging of prior results with similar values performed with this method. Interpretation of those prior results can be made in the context of the updated reference  intervals.     Potassium 10/05/2023 4.1  3.4 - 5.3 mmol/L Final    Carbon Dioxide (CO2) 10/05/2023 26  22 - 29 mmol/L Final    Anion Gap 10/05/2023 11  7 - 15 mmol/L Final    Urea Nitrogen 10/05/2023 11.4  6.0 - 20.0 mg/dL Final    Creatinine 10/05/2023 0.82  0.51 - 0.95 mg/dL Final    GFR Estimate 10/05/2023 86  >60 mL/min/1.73m2 Final    Calcium 10/05/2023 9.5  8.6 - 10.0 mg/dL Final    Chloride 10/05/2023 105  98 - 107 mmol/L Final    Glucose 10/05/2023 110 (H)  70 - 99 mg/dL Final    Alkaline Phosphatase 10/05/2023 105 (H)  35 - 104 U/L Final    AST 10/05/2023 21  0 - 45 U/L Final    Reference intervals for this test were updated on 6/12/2023 to more accurately reflect our healthy population. There may be differences in the flagging of prior results with similar values performed with this method. Interpretation of those prior results can be made in the context of the updated reference intervals.    ALT 10/05/2023 18  0 - 50 U/L Final    Reference intervals for this test were updated on 6/12/2023 to more accurately reflect our healthy population. There may be differences in the flagging of prior results with similar values performed with this method. Interpretation of those prior results can be made in the context of the updated reference intervals.      Protein Total 10/05/2023 6.6  6.4 - 8.3 g/dL Final    Albumin 10/05/2023 4.3  3.5 - 5.2 g/dL Final    Bilirubin Total 10/05/2023 0.4  <=1.2 mg/dL Final    TSH 10/05/2023 3.63  0.30 - 4.20 uIU/mL Final    WBC Count 10/05/2023 6.1  4.0 - 11.0 10e3/uL Final    RBC Count 10/05/2023 4.60  3.80 - 5.20 10e6/uL Final    Hemoglobin 10/05/2023 13.6  11.7 - 15.7 g/dL Final    Hematocrit 10/05/2023 40.7  35.0 - 47.0 % Final    MCV 10/05/2023 89  78 - 100 fL Final    MCH 10/05/2023 29.6  26.5 - 33.0 pg Final    MCHC 10/05/2023 33.4  31.5 - 36.5 g/dL Final    RDW 10/05/2023 13.4  10.0 - 15.0 % Final    Platelet Count 10/05/2023 207  150 - 450 10e3/uL Final    % Neutrophils  10/05/2023 55  % Final    % Lymphocytes 10/05/2023 34  % Final    % Monocytes 10/05/2023 8  % Final    % Eosinophils 10/05/2023 2  % Final    % Basophils 10/05/2023 1  % Final    % Immature Granulocytes 10/05/2023 1  % Final    Absolute Neutrophils 10/05/2023 3.3  1.6 - 8.3 10e3/uL Final    Absolute Lymphocytes 10/05/2023 2.1  0.8 - 5.3 10e3/uL Final    Absolute Monocytes 10/05/2023 0.5  0.0 - 1.3 10e3/uL Final    Absolute Eosinophils 10/05/2023 0.1  0.0 - 0.7 10e3/uL Final    Absolute Basophils 10/05/2023 0.1  0.0 - 0.2 10e3/uL Final    Absolute Immature Granulocytes 10/05/2023 0.0  <=0.4 10e3/uL Final    Estradiol 10/05/2023 12  pg/mL Final    Healthy Men:   11.3-43.2 pg/mL    Healthy Postmenopausal Women:  Postmenopause: <5-138 pg/mL    Healthy Pregnant Women:  1st trimester: 154-3243 pg/mL  2nd trimester: 1561-11138 pg/mL  3rd trimester: 8525->06479 pg/mL    Healthy Women Cycle Phase:  Follicular: 30.9-90.4 pg/mL  Ovulation: 60.4-533 pg/mL  Luteal: 60.4-232 pg/mL    Healthy Women Cycle Sub-Phase:  Early Follicular: 20.5-62.8 pg/mL  Intermediate Follicular: 26-79.8 pg/mL  Late Follicular: 49.5-233 pg/mL  Ovulation: 60.4-602 pg/mL  Early Luteal: 51.1-179 pg/mL  Intermediate Luteal: 66.5-305 pg/mL  Late Luteal: 30.2-222 pg/mL    FSH 10/05/2023 63.1  mIU/mL Final    19 years and older:   Follicular phase: 3.5-12.5 mIU/mL   Ovulation phase: 4.7-21.5 mIU/mL   Luteal phase: 1.7-7.7 mIU/mL   Postmenopause: 25.8-134.8 mIU/mL       Luteinizing Hormone 10/05/2023 31.3  mIU/mL Final    FEMALE:  Age  0 - 6 mo:  <0.1-8.2 mIU/mL  6 mo - 11 years: <0.1-1.3 mIU/mL   11 - 14 years: <0.1-10 mIU/mL   14 - 19 years: 0.4-25 mIU/mL     19 years and older:   Follicular Phase: 2.4-12.6 mIU/mL  Ovulation Phase: 14.0-95.6 mIU/mL  Luteal Phase: 1.0-11.4  mIU/mL  Postmenopausal: 7.7-58.5 mIU/mL      Progesterone 10/05/2023 0.5  ng/mL Final    Healthy Postmenopausal Women  Postmenopause: <0.1-0.126  Healthy Pregnant Women  1st Trimester:  11.0-44.3  2nd Trimester: 25.4-83.4  3rd Trimester: 58.7-214  Healthy Women Cycle Phase  Follicular: <0.1-0.2  Ovulation: 0.1-4.1  Luteal: 4.1-14.5  Healthy Women Cycle Sub Phase  Early Follicular: <0.1-0.3  Intermediate Follicular: <0.1-0.2  Late Follicular: <0.1-0.2  Ovulation: <0.1-2.4  Early Luteal: 2.4-15.1  Intermediate Luteal: 4.8-20.9  Late Luteal: 0.5-13.5       Assessment/Plan  1. Infection due to 2019 novel coronavirus  This is a pleasant 53-year-old female with past medical history significant for NURY, anxiety/depression, prediabetes, and obesity who presents to video visit to discuss her current COVID-19 infection.  The patient has risk factors for severe COVID including NURY, anxiety/depression, prediabetes, and obesity.  We had discussion over risk/benefits/side effects of Paxlovid or molnupiravir including GI distress and the risk for rebound.  After our discussion, patient wanted to spend more time thinking about her options which is reasonable considering this is only day 1 of symptoms if her symptoms started on Tuesday.  Advised patient to touch base with our clinic tomorrow before close of business if she decides that she would like to go through with an oral antiviral rx for COVID.She should continue supportive care at this time including NSAIDs, humidifier, rest, OTC mucinex, and hydration.    Instructed patient go to nearest emergency department for any escalating/worsening symptoms including (but not limited to) chest pain, shortness of breath, intractable fevers/nausea/vomiting/diarrhea, etc.      Follow-up as needed. ER precautions as noted above.    All questions/concerns addressed. Patient stated understanding/agreement to plan of care.    BETO Blackwood, CNP  [facility] [employer]    Note: Chart documentation was done in part with Dragon Voice Recognition software.  Although reviewed after completion, some word and grammatical errors may remain. Please contact  author for any clarification or concerns.      Video-Visit Details    Type of service:  Video Visit   Video start: 11:24am  Video end: 11:41am  Originating Location (pt. Location): Home  Distant Location (provider location):  On-site  Platform used for Video Visit: Beau

## 2024-01-08 ENCOUNTER — OFFICE VISIT (OUTPATIENT)
Dept: ENDOCRINOLOGY | Facility: CLINIC | Age: 54
End: 2024-01-08
Attending: NURSE PRACTITIONER
Payer: COMMERCIAL

## 2024-01-08 ENCOUNTER — TELEPHONE (OUTPATIENT)
Dept: ENDOCRINOLOGY | Facility: CLINIC | Age: 54
End: 2024-01-08

## 2024-01-08 VITALS
OXYGEN SATURATION: 97 % | BODY MASS INDEX: 36.38 KG/M2 | WEIGHT: 213.1 LBS | SYSTOLIC BLOOD PRESSURE: 130 MMHG | HEIGHT: 64 IN | HEART RATE: 88 BPM | RESPIRATION RATE: 16 BRPM | DIASTOLIC BLOOD PRESSURE: 85 MMHG

## 2024-01-08 DIAGNOSIS — E66.01 CLASS 2 SEVERE OBESITY WITH SERIOUS COMORBIDITY AND BODY MASS INDEX (BMI) OF 36.0 TO 36.9 IN ADULT, UNSPECIFIED OBESITY TYPE (H): Primary | ICD-10-CM

## 2024-01-08 DIAGNOSIS — E66.812 CLASS 2 SEVERE OBESITY WITH SERIOUS COMORBIDITY AND BODY MASS INDEX (BMI) OF 36.0 TO 36.9 IN ADULT, UNSPECIFIED OBESITY TYPE (H): Primary | ICD-10-CM

## 2024-01-08 PROBLEM — K76.0 FATTY LIVER: Status: ACTIVE | Noted: 2024-01-08

## 2024-01-08 PROBLEM — Z87.42 HISTORY OF ENDOMETRIOSIS: Status: ACTIVE | Noted: 2021-04-02

## 2024-01-08 PROBLEM — R73.03 PRE-DIABETES: Status: ACTIVE | Noted: 2024-01-08

## 2024-01-08 PROCEDURE — 99214 OFFICE O/P EST MOD 30 MIN: CPT

## 2024-01-08 RX ORDER — ACETAMINOPHEN 325 MG/1
325-650 TABLET ORAL
COMMUNITY
End: 2024-01-22

## 2024-01-08 ASSESSMENT — PAIN SCALES - GENERAL: PAINLEVEL: NO PAIN (0)

## 2024-01-08 NOTE — TELEPHONE ENCOUNTER
PA Initiation    Medication: ZEPBOUND 2.5 MG/0.5ML SC SOAJ  Insurance Company: [employer] ([facility]) - Phone [phone] Fax [phone]  Pharmacy Filling the Rx: [id number] - [place], [place] - [address]  Filling Pharmacy Phone: [phone]  Filling Pharmacy Fax: [phone]  Start Date: 1/8/2024     Key: [id number]

## 2024-01-08 NOTE — PATIENT INSTRUCTIONS
"Sanju Wilson, it was nice to meet you today!  Thank you for allowing us the privilege of caring for you. We hope we provided you with the excellent service you deserve.   Please let us know if there is anything else we can do for you so that we can be sure you are completely satisfied with your care experience.    To ensure the quality of our services you may be receiving a patient satisfaction survey from an independent patient satisfaction monitoring company.    The greatest compliment you can give is a \"Likely to Recommend\"    Your visit was with MAL LEMA PA-C today.    Instructions per today's visit:   Start Zepbound 2.5mg once weekly for 4 weeks, then increase to 5.0mg once weekly. Consider Wegovy and Saxenda as needed. If not covered will start Phentermine or Metformin.   Follow up with [facility] Pharmacist in 6 weeks   Follow up with Mal Hemphill in 3 months     ___________________________________________________________________________  Important contact and scheduling information:  Please call our contact center at [phone] to schedule your next appointments.  For any nursing questions or concerns call Heidy Santana LPN at [phone] or Carly Gutierres RN at [phone]  Please call during clinic hours Monday through Friday 8:00a - 4:00p if you have questions or you can contact us via [employer] at anytime and we will reply during clinic hours.    Lab results will be communicated through My Chart or letter (if My Chart not used). Please call the clinic if you have not received communication after 1 week or if you have any questions.?  Clinic Fax: [phone]  __________________________________________________________________________    If labs were ordered today:    Please make an appointment to have them drawn at your convenience.     To schedule the Lab Appointment using [employer]:  Select \"Schedule an Appointment\"  Select \"Lab Only\"  For \"A couple of questions\", select \"Other\"  For \"Which locations work for " you?, select the location and set up the appointment    To schedule by phone call [phone] to schedule a lab only appointment at any [facility] lab.  ___________________________________________________________________________  Work with A Health !  Virtual Sessions are Available through [facility]    To learn more, call to schedule a free, Health  Q&A appointment: [phone]     What is Health Coaching?  Do you know what you are supposed to do, but you just aren't doing it?  Then, HEALTH COACHING may help you!   Get unstuck and move forward with the support of a professionally trained NBC-HWC (National Board-Certified Health and ) who uses evidence-based approaches to help you move forward with healthy lifestyle changes in the areas of weight loss, stress management and overall well-being.    Health Coaches help you identify goals that will work best for you. Health Coaches provide support and encouragement with overcoming barriers and help you to find inspiration and motivation to lead a healthy lifestyle.    Option one:  Health Coaching 3-Pack; Three, 30-minute Health Coaching Visits, for $99  Visits are done virtually (phone or video)  This is a self pay service; we do not accept insurance for maikel coaching.    Option two:   The 24 week Plan; 11 Health Coaching Visits, and a 7 months subscription to [employer]-- on-demand fitness, nutrition and mindfulness classes, for $499 (employee discounts may be available). Participants will also meet regularly with a weight management Medical Provider and a Registered/Licensed Dietician.  This is a self-pay service; we do not accept insurance for health coaching.    To Schedule a free Health  Q&A appointment to learn more,  call [phone].  ____________________________________________________________________  [facility]  Healthy Lifestyle  "Group    Healthy Lifestyle Group  This is a 60 minute virtual coaching group for those who want to lead a healthier lifestyle. Come together to set goals and overcome barriers in a supportive group environment. We will address the four pillars of health--nutrition, exercise, sleep and emotional well-being.  This group is highly recommended for those who are participating in the 24 week Healthy Lifestyle Plan and our Health Coaching sessions.    WHEN: This group meets the first Friday of the month, 12:30 PM - 1:30 PM online, via a zoom meeting.      FACILITATOR: Led by National Board Certified Health and , Dinorah Madden, [facility].    TO REGISTER: Please call the Call Center at [phone] to register. You will get an appointment to attend in [facility]. Fifteen minutes prior to the meeting, complete the e-check in and you will get the link to join the meeting.  There is no charge to attend this group and space is limited.      2023 and 2024 Meeting Topics and Dates:    November 3: Introduction to Mindfulness (Learn simple and effective mindfulness practices and how it can benefit you)    December 8: Let's Talk (guided discussion on our wins and challenges)    January 5: New Years Vision: Manifest your Best 2024! (Guided imagery,  journaling and discussion)    February 2: Let's Talk    March 1: 10 Percent Happier by Girma Alvarado (Book Bites; a guided discussion on the nuggets of wisdom from favorite wellness books; no need to read the book but highly encouraged)    April 5: Let's Talk    May 3: \"Essentialism; The Disciplined Pursuit of Less by Al Lamb (book bites discussion)    June 7: Let's Talk    July 5: NO MEETING, off for the 4th of July Holiday    August 2: The Blue Zones, Secrets for Living a Longer Life by Girma Bar (book bites discussion)      If you would like bariatric surgery specific support group info please let your care team know.         Thank you,   MISTY Mcelroy" Weight Management Team    Zepbound (Tirzepatide)    Zepbound (Tirzepatide): is an injectable prescription medicine recently FDA approved for adults with obesity or overweight with an additional condition affected by their weight (type 2 diabetes, high blood pressure, high cholesterol, obstructive sleep apnea, etc). Zepbound contains the same active ingredient as what is in Mounjaro, an injectable FDA approved for Type 2 Diabtes. Zepbound is intended to be used along with dietary/behavioral changes and consistent exercise to improve assist with weight loss and other health improvement outcomes.     It is given as a shot once a week. It activates the body's receptors for GIP (glucose-dependent insulinotropic polypeptide) and GLP-1 (glucagon-like peptide-1) receptor, two naturally occurring hormones that help tell the brain that you are full. It also works is by slowing down how quickly food leaves your stomach. What this means for you: You will start to feel valderrama more quickly, notice portion changes and eat less often between meals. It is still important to maintain consistent eating schedule with meals +/- snacks and get in good hydration.      Dosing:  Initial dose: 2.5 mg injected subcutaneously once weekly for 4 weeks  Further dose changes can include: increase to 5 mg once weekly for 4 weeks, then 7.5 mg once weekly for 4 weeks, then 10 mg once weekly for 4 weeks then 12.5 mg once weekly for 4 weeks, then 15 mg (maximum dose) once weekly.    Dose changes are based on how you are tolerating the current dose, what benefits you are seeing at the current dose and if improvement in effectiveness of the drug is needed. The goal is to find the dose that works best for you with the least amount of side effects. You may find you reach this at a lower dose than the maximum dose.     Side effects: Patients are advised to eat more slowly and purposefully. Give yourself less portions. You may find after starting this  medication you have a new point of fullness. It is also important to stay adequately hydrated on the medication to reduce risks of some of these side effects. Many of these side effects (nausea, diarrhea, etc) occurred during dose escalation but did improve over time with continuing the medication. If side effects are unmanageable, reach out to your prescribing provider.     The risk of pancreatitis (inflammation of the pancreas) has been associated with this type of medication, but is very rare.  If you have had pancreatitis in the past, this medication may not be for you. Please let us know about any past history of pancreas problems. If you experience persistent severe abdominal pain (sometimes radiating to the back potentially accompanied by vomiting and have a fever), stop the medication and contact your provider immediately for assessment. They will do a blood test to check for pancreatitis.       How to Inject:   https://www.zepbound.PoshVine.com/how-to-use    Storage:   Store your pen in the refrigerator. You may store your pen at room temperature for up to 21 days. If you store the pen at room temperature, do not return the pen to the refrigerator. Discard the pen if not used within 21 days after removing from the refrigerator.      For any questions or concerns please send a [employer] message to our team or call our weight management call center at [phone] during regular business hours.

## 2024-01-08 NOTE — NURSING NOTE
"Chief Complaint   Patient presents with    New Patient     [facility]       Vitals:    01/08/24 1055   BP: 130/85   BP Location: Left arm   Patient Position: Sitting   Cuff Size: Adult Large   Pulse: 88   Resp: 16   SpO2: 97%   Weight: 96.7 kg (213 lb 1.6 oz)   Height: 1.626 m (5' 4\")       Body mass index is 36.58 kg/m .                          Bharat Kirk NRP   "

## 2024-01-08 NOTE — TELEPHONE ENCOUNTER
"PRIOR AUTHORIZATION DENIED    Medication: ZEPBOUND 2.5 MG/0.5ML SC SOAJ  Insurance Company: [employer] ([facility]) - Phone [phone] Fax [phone]  Denial Date: 1/8/2024  Denial Reason(s): \"The requested medication and/or diagnosis are not a covered benefit and are excluded from coverage in accordance with the terms and conditions of your plan benefit. Therefore, this request has been administratively denied.\"  Appeal Information: See denial letter         "
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
## 2024-01-08 NOTE — ASSESSMENT & PLAN NOTE
Overweight onset in 30s. Weight gain gradual. Has been weight stable around 180-190lbs for the past 10 years.  Previously able to lose weight through weight water and noom, around 10lbs. But was not sustainable long term. In the past year has gained around 15-20lbs and no longer feel comfortable. Weight gain influenced by menopause, increase stress at work, increase portion sizes/harder to get satisfied, and possibly with starting anti-anxiety medications. Family hx of obesity also influences weight. Wants to lose weight to help with increase activity, especially with son.     Discussed AOMs to help with weight loss:   - Phentermine can be considered. Some concern with increase in anxiety. But, mood is currently stable on medications. Discussed risk of serotonin syndrome. No hx of HTN. Would need to monitor mood if started.   - Topiramate concern with brain fog after concussion 6 months ago. No hx of kidney disease or kidney stone.   - Naltrexone can be considered in the future. Hx of liver disease. Not taking opioids.   - Metformin can be considered. History of pre-diabetes. No hx of kidney disease. GFR 86.   - GLP-1 to be started today. No contraindications. Discussed side effects, risks, and benefits. History of pre-diabetes. No personal hx of pancreatitis. No personal or family hx of MTC or MENII. Will start Zepbound. Consider Wegovy and Saxenda as needed.

## 2024-01-22 ENCOUNTER — OFFICE VISIT (OUTPATIENT)
Dept: FAMILY MEDICINE | Facility: CLINIC | Age: 54
End: 2024-01-22
Payer: COMMERCIAL

## 2024-01-22 ENCOUNTER — VIRTUAL VISIT (OUTPATIENT)
Dept: ENDOCRINOLOGY | Facility: CLINIC | Age: 54
End: 2024-01-22
Payer: COMMERCIAL

## 2024-01-22 ENCOUNTER — TELEPHONE (OUTPATIENT)
Dept: ENDOCRINOLOGY | Facility: CLINIC | Age: 54
End: 2024-01-22

## 2024-01-22 VITALS
DIASTOLIC BLOOD PRESSURE: 84 MMHG | RESPIRATION RATE: 19 BRPM | SYSTOLIC BLOOD PRESSURE: 138 MMHG | TEMPERATURE: 98.7 F | HEART RATE: 79 BPM | OXYGEN SATURATION: 98 %

## 2024-01-22 VITALS — HEIGHT: 64 IN | WEIGHT: 207 LBS | BODY MASS INDEX: 35.34 KG/M2

## 2024-01-22 DIAGNOSIS — S29.011A MUSCLE STRAIN OF ANTERIOR CHEST WALL: ICD-10-CM

## 2024-01-22 DIAGNOSIS — L85.3 DRY SKIN: ICD-10-CM

## 2024-01-22 DIAGNOSIS — Z71.3 NUTRITIONAL COUNSELING: Primary | ICD-10-CM

## 2024-01-22 DIAGNOSIS — R73.03 PRE-DIABETES: ICD-10-CM

## 2024-01-22 DIAGNOSIS — E66.9 OBESITY: ICD-10-CM

## 2024-01-22 DIAGNOSIS — L85.8 KERATOSIS PILARIS: ICD-10-CM

## 2024-01-22 DIAGNOSIS — K76.0 FATTY LIVER: ICD-10-CM

## 2024-01-22 DIAGNOSIS — H93.8X3 PRESSURE SENSATION IN BOTH EARS: Primary | ICD-10-CM

## 2024-01-22 PROCEDURE — 97802 MEDICAL NUTRITION INDIV IN: CPT | Mod: 95 | Performed by: DIETITIAN, REGISTERED

## 2024-01-22 PROCEDURE — 99207 PR NO CHARGE LOS: CPT | Mod: 95 | Performed by: DIETITIAN, REGISTERED

## 2024-01-22 ASSESSMENT — PAIN SCALES - GENERAL
PAINLEVEL: NO PAIN (0)
PAINLEVEL: NO PAIN (0)

## 2024-01-22 NOTE — PROGRESS NOTES
"Video-Visit Details    Type of service:  Video Visit    Video Start Time: 2:45 pm   Video End Time: 3:05 PM    Originating Location (pt. Location): Home    Distant Location (provider location):  Offsite (providers home)     Platform used for Video Visit: Next Glass      New Weight Management Nutrition Consultation    Steve Aggarwal is a 53 year old female presents today for new weight management nutrition consultation.  Patient referred by Yuly Don PA-C on 2024.    Patient with Co-morbidities of obesity includin/2/2024     8:22 PM   --   I have the following health issues associated with obesity Pre-Diabetes    Fatty Liver   I have the following symptoms associated with obesity Knee Pain    Fatigue    Hip Pain         Anthropometrics:  Weight 24: 213 lbs with BMI 36.58    Estimated body mass index is 36.58 kg/m  as calculated from the following:    Height as of 24: 1.626 m (5' 4\").    Weight as of 24: 96.7 kg (213 lb 1.6 oz).    Medications for Weight Loss:  Zepbound prescribed 24 - Denied, considering other options    NUTRITION HISTORY  Food allergies: NKFA  Food intolerances: Dairy as a whole per pt  Vitamin/Mineral Supplements: Calcium, Vitamin D  Previous methods of diet modification for weight loss: WW, Noom (both not sustainable long-term per pt)  RD before: No    Pt goals: be healthier for self and kid (15 year old), feel better physically     Eats 3 meals/day, minimal snacks.   Reports eating out of boredom.     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 Coffee, breakfast burrito   If you do eat lunch, " what types of food do you typically eat? School lunch, including salad   If you do eat supper, what types of food do you typically eat? Variety - meat, vegetable, grain   How many glasses of juice do you drink in a typical day? 0   How many of glasses of milk do you drink in a typical day? 0   How many 8oz glasses of sugar containing drinks such as Dhruv-Aid/sweet tea do you drink in a day? 0   How many cans/bottles of sugar pop/soda/tea/sports drinks do you drink in a day? 0   How many cans/bottles of diet pop/soda/tea or sports drink do you drink in a day? 0   How often do you have a drink of alcohol? 2-4 Times a Month   If you do drink, how many drinks might you have in a day? 1 or 2           1/2/2024     8:22 PM   Eating Habits   Generally, my meals include foods like these bread, pasta, rice, potatoes, corn, crackers, sweet dessert, pop, or juice Almost Everyday   Generally, my meals include foods like these fried meats, brats, burgers, french fries, pizza, cheese, chips, or ice cream A Few Times a Week   Eat fast food (like McDonalds, Burger Aries, Taco Bell) Less Than Weekly   Eat at a buffet or sit-down restaurant Less Than Weekly   Eat most of my meals in front of the TV or computer A Few Times a Week   Often skip meals, eat at random times, have no regular eating times A Few Times a Week   Rarely sit down for a meal but snack or graze throughout Less Than Weekly   Eat extra snacks between meals Once a Week   Eat most of my food at the end of the day Never   Eat in the middle of the night or wake up at night to eat Never   Eat extra snacks to prevent or correct low blood sugar Less Than Weekly   Eat to prevent acid reflux or stomach pain Never   Worry about not having enough food to eat Never   I eat when I am depressed Once a Week   I eat when I am stressed A Few Times a Week   I eat when I am bored A Few Times a Week   I eat when I am anxious Once a Week   I eat when I am happy or as a reward A Few Times a  Week   I feel hungry all the time even if I just have eaten Less Than Weekly   Feeling full is important to me Once a Week   I finish all the food on my plate even if I am already full Once a Week   I can't resist eating delicious food or walk past the good food/smell Almost Everyday   I eat/snack without noticing that I am eating Never   I eat when I am preparing the meal A Few Times a Week   I eat more than usual when I see others eating Once a Week   I have trouble not eating sweets, ice cream, cookies, or chips if they are around the house Almost Everyday   I think about food all day Once a Week   What foods, if any, do you crave? Chips/Crackers   Please list any other foods you crave? Mostly salty foods but sometimes sugar, too           1/2/2024     8:22 PM   Amount of Food   I feel out of control when eating Almost Everyday   I eat a large amount of food, like a loaf of bread, a box of cookies, a pint/quart of ice cream, all at once Never   I eat a large amount of food even when I am not hungry Never   I eat rapidly Almost Everyday   I eat alone because I feel embarrassed and do not want others to see how much I have eaten Never   I eat until I am uncomfortably full Monthly   I feel bad, disgusted, or guilty after I overeat Never         1/2/2024     8:22 PM   Activity/Exercise History   How much of a typical 12 hour day do you spend sitting? Half the Day   How much of a typical 12 hour day do you spend lying down? Less Than Half the Day   How much of a typical day do you spend walking/standing? Half the Day   How many hours (not including work) do you spend on the TV/Video Games/Computer/Tablet/Phone? 2-3 Hours   How many times a week are you active for the purpose of exercise? Once a Week   What keeps you from being more active? Too tired    Other   How many total minutes do you spend doing some activity for the purpose of exercising when you exercise? 15-30 Minutes       Nutrition Prescription  Recommended  energy/nutrient modification.    Nutrition Diagnosis    Obesity r/t long history of positive energy balance aeb BMI >30.    Nutrition Intervention  Reviewed current dietary habits and pts history   Discussed long-term goals pt hopes to accomplish in RD appointments  Answered pt questions  Coordination of care   Nutrition education   AVS and handouts via [employer]hart    Expected Engagement: good    Nutrition Goals/Resources:  Aim to have a balanced meal at dinner time (Consider Plate Method as discussed)     Estimated recommended needs for weight loss  1 cups fruit/day  2+ cups vegetables   6-7 oz protein  (1 oz = 1 egg, 1 Tbsp peanut butter, 1/4 cup cooked beans, peas, lentils)   5-6 servings grain (1 serving = 1 slice bread, 1/2 cup cooked rice/pasta/cereal   1 servings dairy (can sub for dairy alternatives or protein) (1 serving = 1 cup milk, 1 cup yogurt, 1.5 oz hard cheese, 1 cup cottage cheese, 1/3 cup shredded cheese)   Fat in moderation         Plate method can be used for general guidance on balanced meals/portion sizes                 Make 1/2 your plate non starchy vegetable(cauliflower, broccoli, asparagus, Stella sprouts, lettuce, carrots, for example).                3+ oz lean protein sources (salmon/skinless chicken/turkey breast/pork loin/lean cuts of beef/ or non-animal protein such as black, kidney or kumar beans, tofu/edamame/tempeh)     (Note: 3 oz = deck of cards size)                 1/2 to 1 cup carbohydrate choices such as whole grain starches or starchy vegetables or fruit. For example: quinoa, brown rice, barley, potatoes, sweet potatoes, winter squash, peas, corn, or fruit.               Choose ~0-2 added fat servings at a meal (avocados, nut butters, nuts or seeds, olive oil, vegetable oils).      Starchy vegetable examples:  Corn  Green peas  Winter squash (butternut, spaghetti squash, acorn)  Beans/lentils (not including green beans)  Potatoes  Sweet potatoes     Tips for lowering sodium  content of meals  [email or web address].pdf     Dietary recommendations for health/BP in particular:    - Mediterranean Diet   - DASH Diet    30 Min Recipes   https://www.amazon.com/30-Minute-Mediterranean-Diet-Cookbook-Flavorful/dp/1053965578/ref=asc_df_1641520930/?tag=hyprod-20&linkCode=df0&vbkrqb=537428412332&hvpos=&hvnetw=g&aumojb=[id number]&hvpone=&hvptwo=&hvqmt=&hvdev=c&hvdvcmdl=&hvlocint=&wjytjwac=[id number]&hvtargid=mac-[id number]&psc=1&zugv=[id number]&tag=&ref=&ndnidmh=[id number]&hvpone=&hvptwo=&gazuxs=[id number]&hvpos=&hvnetw=g&ozsqsb=[id number]&hvqmt=&hvdev=c&hvdvcmdl=&hvlocint=&sgnitywz=[id number]&hvtargid=mac-[id number]&gclid=[id number]_BwE    5 Ingredients Recipes  https://www.amazon.com/Ingredients-Mediterranean-Incredible-American-Measurements/dp/4096322337/ref=asc_df_1250319854/?tag=hyprod-20&linkCode=df0&lizqak=[id number]&hvpos=&hvnetw=g&wygkbd=[id number]&hvpone=&hvptwo=&hvqmt=&hvdev=c&hvdvcmdl=&hvlocint=&acuryfnm=[id number]&hvtargid=mac-[id number]&psc=1&xhzf=[id number]     One pot meals  https://www.[employer]/One-Pot-Mediterranean-Xhxvfyk-Lmtnygnap-Iatobzgdy/dp/4914500230/ref=asc_df_1645679845/?tag=hyprod-20&linkCode=df0&glibgt=[id number]&hvpos=&hvnetw=g&pgqcxe=[id number]&hvpone=&hvptwo=&hvqmt=&hvdev=c&hvdvcmdl=&hvlocint=&qfnflahl=[id number]&hvtargid=mac-[id number]&psc=1&rltv=[id number]       Follow-Up:  1 month recommended, pending coverage    Time spent with patient: 20 minutes.  SIOMARA Chapin, RD, LD
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
## 2024-01-22 NOTE — NURSING NOTE
Is the patient currently in the state of [place]? YES    Visit mode:VIDEO    If the visit is dropped, the patient can be reconnected by: VIDEO VISIT: Text to cell phone:   Telephone Information:   Mobile [phone]   Mobile [phone]       Will anyone else be joining the visit? NO  (If patient encounters technical issues they should call [phone])    How would you like to obtain your AVS? MyChart    Are changes needed to the allergy or medication list? N/A  Please remove any meds marked not taking and any flagged for removal.    Reason for visit: Consult    Wt/ht other than 24 hrs:    Pain more than one location:    Miya GIBSONF

## 2024-01-22 NOTE — LETTER
"2024       RE: Steve Aggarwal  [address]  [facility] [address]     Dear Colleague,    Thank you for referring your patient, Steve Aggarwal, to the [facility]NAG[facility] [place] at [facility]. Please see a copy of my visit note below.    Video-Visit Details    Type of service:  Video Visit    Video Start Time: 2:45 pm   Video End Time: 3:05 PM    Originating Location (pt. Location): Home    Distant Location (provider location):  Offsite (providers home)     Platform used for Video Visit: Tencent      New Weight Management Nutrition Consultation    Steve Aggarwal is a 53 year old female presents today for new weight management nutrition consultation.  Patient referred by Yuly Don PA-C on 2024.    Patient with Co-morbidities of obesity includin/2/2024     8:22 PM   --   I have the following health issues associated with obesity Pre-Diabetes    Fatty Liver   I have the following symptoms associated with obesity Knee Pain    Fatigue    Hip Pain         Anthropometrics:  Weight 24: 213 lbs with BMI 36.58    Estimated body mass index is 36.58 kg/m  as calculated from the following:    Height as of 24: 1.626 m (5' 4\").    Weight as of 24: 96.7 kg (213 lb 1.6 oz).    Medications for Weight Loss:  Zepbound prescribed 24 - Denied, considering other options    NUTRITION HISTORY  Food allergies: NKFA  Food intolerances: Dairy as a whole per pt  Vitamin/Mineral Supplements: Calcium, Vitamin D  Previous methods of diet modification for weight loss: WW, Noom (both not sustainable long-term per pt)  RD before: No    Pt goals: be healthier for self and kid (15 year old), feel better physically     Eats 3 meals/day, minimal snacks.   Reports eating out of boredom.     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 Coffee, breakfast burrito   If you do eat lunch, what types of food do you typically eat? School lunch, including salad   If you do eat supper, what types of food do you typically eat? Variety - meat, vegetable, grain   How many glasses of juice do you drink in a typical day? 0   How many of glasses of milk do you drink in a typical day? 0   How many 8oz glasses of sugar containing drinks such as Dhruv-Aid/sweet tea do you drink in a day? 0   How many cans/bottles of sugar pop/soda/tea/sports drinks do you drink in a day? 0   How many cans/bottles of diet pop/soda/tea or sports drink do you drink in a day? 0   How often do you have a drink of alcohol? 2-4 Times a Month   If you do drink, how many drinks might you have in a day? 1 or 2           1/2/2024     8:22 PM   Eating Habits   Generally, my meals include foods like these bread, pasta, rice, potatoes, corn, crackers, sweet dessert, pop, or juice Almost Everyday   Generally, my meals include foods like these fried meats, brats, burgers, french fries, pizza, cheese, chips, or ice cream A Few Times a Week   Eat fast food (like [employer], [employer], Taco Bell) Less Than Weekly   Eat at a buffet or sit-down restaurant Less Than Weekly   Eat most of my meals in front of the TV or computer A Few Times a Week   Often skip meals, eat at random times, have no regular eating times A Few Times a Week   Rarely sit down for a meal but snack or graze throughout Less Than Weekly   Eat extra snacks between meals Once a Week   Eat most of my food at the end of the day Never   Eat in the middle of the night or wake up at night to eat Never   Eat extra snacks to prevent or correct low blood sugar  Less Than Weekly   Eat to prevent acid reflux or stomach pain Never   Worry about not having enough food to eat Never   I eat when I am depressed Once a Week   I eat when I am stressed A Few Times a Week   I eat when I am bored A Few Times a Week   I eat when I am anxious Once a Week   I eat when I am happy or as a reward A Few Times a Week   I feel hungry all the time even if I just have eaten Less Than Weekly   Feeling full is important to me Once a Week   I finish all the food on my plate even if I am already full Once a Week   I can't resist eating delicious food or walk past the good food/smell Almost Everyday   I eat/snack without noticing that I am eating Never   I eat when I am preparing the meal A Few Times a Week   I eat more than usual when I see others eating Once a Week   I have trouble not eating sweets, ice cream, cookies, or chips if they are around the house Almost Everyday   I think about food all day Once a Week   What foods, if any, do you crave? Chips/Crackers   Please list any other foods you crave? Mostly salty foods but sometimes sugar, too           1/2/2024     8:22 PM   Amount of Food   I feel out of control when eating Almost Everyday   I eat a large amount of food, like a loaf of bread, a box of cookies, a pint/quart of ice cream, all at once Never   I eat a large amount of food even when I am not hungry Never   I eat rapidly Almost Everyday   I eat alone because I feel embarrassed and do not want others to see how much I have eaten Never   I eat until I am uncomfortably full Monthly   I feel bad, disgusted, or guilty after I overeat Never         1/2/2024     8:22 PM   Activity/Exercise History   How much of a typical 12 hour day do you spend sitting? Half the Day   How much of a typical 12 hour day do you spend lying down? Less Than Half the Day   How much of a typical day do you spend walking/standing? Half the Day   How many hours (not including work) do you spend on the TV/Video  Games/Computer/Tablet/Phone? 2-3 Hours   How many times a week are you active for the purpose of exercise? Once a Week   What keeps you from being more active? Too tired    Other   How many total minutes do you spend doing some activity for the purpose of exercising when you exercise? 15-30 Minutes       Nutrition Prescription  Recommended energy/nutrient modification.    Nutrition Diagnosis    Obesity r/t long history of positive energy balance aeb BMI >30.    Nutrition Intervention  Reviewed current dietary habits and pts history   Discussed long-term goals pt hopes to accomplish in RD appointments  Answered pt questions  Coordination of care   Nutrition education   AVS and handouts via Tins.ly    Expected Engagement: good    Nutrition Goals/Resources:  Aim to have a balanced meal at dinner time (Consider Plate Method as discussed)     Estimated recommended needs for weight loss  1 cups fruit/day  2+ cups vegetables   6-7 oz protein  (1 oz = 1 egg, 1 Tbsp peanut butter, 1/4 cup cooked beans, peas, lentils)   5-6 servings grain (1 serving = 1 slice bread, 1/2 cup cooked rice/pasta/cereal   1 servings dairy (can sub for dairy alternatives or protein) (1 serving = 1 cup milk, 1 cup yogurt, 1.5 oz hard cheese, 1 cup cottage cheese, 1/3 cup shredded cheese)   Fat in moderation         Plate method can be used for general guidance on balanced meals/portion sizes                 Make 1/2 your plate non starchy vegetable(cauliflower, broccoli, asparagus, [place] sprouts, lettuce, carrots, for example).                3+ oz lean protein sources (salmon/skinless chicken/turkey breast/pork loin/lean cuts of beef/ or non-animal protein such as black, kidney or kumar beans, tofu/edamame/tempeh)     (Note: 3 oz = deck of cards size)                 1/2 to 1 cup carbohydrate choices such as whole grain starches or starchy vegetables or fruit. For example: quinoa, brown rice, barley, potatoes, sweet potatoes, winter squash,  peas, corn, or fruit.               Choose ~0-2 added fat servings at a meal (avocados, nut butters, nuts or seeds, olive oil, vegetable oils).      Starchy vegetable examples:  Corn  Green peas  Winter squash (butternut, spaghetti squash, acorn)  Beans/lentils (not including green beans)  Potatoes  Sweet potatoes     Tips for lowering sodium content of meals  http://www.WorkingPoint/486390.pdf     Dietary recommendations for health/BP in particular:    - Mediterranean Diet   - DASH Diet    30 Min Recipes   https://www.amazon.com/30-Minute-Mediterranean-Diet-Cookbook-Flavorful/dp/4975346759/ref=asc_df_1641520930/?tag=hyprod-20&linkCode=df0&yorohc=181785804930&hvpos=&hvnetw=g&ntqerr=[id number]&hvpone=&hvptwo=&hvqmt=&hvdev=c&hvdvcmdl=&hvlocint=&amkcwmke=[id number]&hvtargid=mac-[id number]&psc=1&prnk=[id number]&tag=&ref=&qgcevic=[id number]&hvpone=&hvptwo=&ditymx=[id number]&hvpos=&hvnetw=g&vseqvm=[id number]&hvqmt=&hvdev=c&hvdvcmdl=&hvlocint=&hfbvuxfh=9040208&hvtargid=mac-[id number]&gclid=[id number]_BwE    5 Ingredients Recipes  https://www.amazon.com/Ingredients-Mediterranean-Incredible-American-Measurements/dp/1202384322/ref=asc_df_1250319854/?tag=hyprod-20&linkCode=df0&pbiymw=764859078279&hvpos=&hvnetw=g&ycmqxv=[id number]&hvpone=&hvptwo=&hvqmt=&hvdev=c&hvdvcmdl=&hvlocint=&kvjcbmfw=[id number]&hvtargid=mac-[id number]&psc=1&zfll=[id number]     One pot meals  https://www.Vaunte/One-Pot-Mediterranean-Sakblbh-Rbrmpwpnz-Swvluansz/dp/6647788227/ref=asc_df_1645679845/?tag=hyprod-20&linkCode=df0&rloamu=526938586334&hvpos=&hvnetw=g&jwfeny=[id number]&hvpone=&hvptwo=&hvqmt=&hvdev=c&hvdvcmdl=&hvlocint=&kdlexfoc=[id number]&hvtargid=mac-[id number]&psc=1&qcvx=[id number]       Follow-Up:  1 month recommended, pending coverage    Time spent with patient: 20 minutes.  Belinda Grimes,  MPP-D, RD, LD

## 2024-01-22 NOTE — PATIENT INSTRUCTIONS
Nutrition Goals/Resources:  Aim to have a balanced meal at dinner time (Consider Plate Method as discussed)     Estimated recommended needs for weight loss  1 cups fruit/day  2+ cups vegetables   6-7 oz protein  (1 oz = 1 egg, 1 Tbsp peanut butter, 1/4 cup cooked beans, peas, lentils)   5-6 servings grain (1 serving = 1 slice bread, 1/2 cup cooked rice/pasta/cereal   1 servings dairy (can sub for dairy alternatives or protein) (1 serving = 1 cup milk, 1 cup yogurt, 1.5 oz hard cheese, 1 cup cottage cheese, 1/3 cup shredded cheese)   Fat in moderation         Plate method can be used for general guidance on balanced meals/portion sizes                 Make 1/2 your plate non starchy vegetable(cauliflower, broccoli, asparagus, [place] sprouts, lettuce, carrots, for example).                3+ oz lean protein sources (salmon/skinless chicken/turkey breast/pork loin/lean cuts of beef/ or non-animal protein such as black, kidney or kumar beans, tofu/edamame/tempeh)     (Note: 3 oz = deck of cards size)                 1/2 to 1 cup carbohydrate choices such as whole grain starches or starchy vegetables or fruit. For example: quinoa, brown rice, barley, potatoes, sweet potatoes, winter squash, peas, corn, or fruit.               Choose ~0-2 added fat servings at a meal (avocados, nut butters, nuts or seeds, olive oil, vegetable oils).      Starchy vegetable examples:  Corn  Green peas  Winter squash (butternut, spaghetti squash, acorn)  Beans/lentils (not including green beans)  Potatoes  Sweet potatoes     Tips for lowering sodium content of meals  http://[email or web address].[email or web address].pdf     Dietary recommendations for health/BP in particular:    - Mediterranean Diet   - DASH Diet    30 Min Recipes    https://www.amazon.com/30-Minute-Mediterranean-Diet-Cookbook-Flavorful/dp/1662069513/ref=asc_df_1641520930/?tag=hyprod-20&linkCode=df0&pvuoaq=768055057138&hvpos=&hvnetw=g&sczjsc=[id number]&hvpone=&hvptwo=&hvqmt=&hvdev=c&hvdvcmdl=&hvlocint=&qigyxgpp=[id number]&hvtargid=mac-[id number]&psc=1&exca=[id number]&tag=&ref=&effyxyr=[id number]&hvpone=&hvptwo=&hazunz=[id number]&hvpos=&hvnetw=g&lnlxgi=[id number]&hvqmt=&hvdev=c&hvdvcmdl=&hvlocint=&austbewy=1046581&hvtargid=mac-[id number]&gclid=EAIaIQobChMI1YzuzPPxgwMVKEdHAR24Ow0CEAQYASABEgI3ivD_BwE    5 Ingredients Recipes  https://www.amazon.com/Ingredients-Mediterranean-Incredible-American-Measurements/dp/6089680834/ref=asc_df_1250319854/?tag=hyprod-20&linkCode=df0&styvtm=425888984947&hvpos=&hvnetw=g&wibyrm=[id number]&hvpone=&hvptwo=&hvqmt=&hvdev=c&hvdvcmdl=&hvlocint=&icftisgy=[id number]&hvtargid=mac-[id number]&psc=1&hrka=[id number]     One pot meals  https://www.[employer]/One-Pot-Mediterranean-Dctfgtp-Jpyurgued-Fvmztifod/dp/5004357150/ref=asc_df_1645679845/?tag=hyprod-20&linkCode=df0&epjomo=134548011436&hvpos=&hvnetw=g&hrufrn=0838086391292228523&hvpone=&hvptwo=&hvqmt=&hvdev=c&hvdvcmdl=&hvlocint=&amqxgrpa=[id number]&hvtargid=mac-[id number]&psc=1&xmbz=[id number]       Follow-Up:  1 month recommended, pending coverage    Belinda Ridley ([place]), [place], RD, LD  Clinic #: [phone]

## 2024-01-22 NOTE — NURSING NOTE
ROOM:3  [facility]    Preferred Name: Steve     How did you hear about us?  Current Patient    53 year old  Chief Complaint   Patient presents with     Ear Problem     Ear pressure in both ears, seems to be up and down, been a couple of weeks        Blood pressure 138/84, pulse 79, temperature 98.7  F (37.1  C), temperature source Oral, resp. rate 19, SpO2 98%. There is no height or weight on file to calculate BMI.  BP completed using cuff size:        Patient Active Problem List   Diagnosis     Anxiety state     History of endometriosis     Pre-diabetes     Class 2 severe obesity with serious comorbidity and body mass index (BMI) of 36.0 to 36.9 in adult (H)     Fatty liver       Wt Readings from Last 2 Encounters:   01/08/24 96.7 kg (213 lb 1.6 oz)   10/12/23 94.8 kg (209 lb)     BP Readings from Last 3 Encounters:   01/22/24 138/84   01/08/24 130/85   09/25/23 126/85       Allergies   Allergen Reactions     Erythromycin Base [Erythromycin] Nausea and Vomiting     Sulfa (Sulfonamide Antibiotics) [Sulfa Antibiotics] Swelling       Current Outpatient Medications   Medication     finasteride (PROSCAR) 5 MG tablet     PARoxetine (PAXIL) 30 MG tablet     tirzepatide-Weight Management (ZEPBOUND) 2.5 MG/0.5ML prefilled pen     [START ON 2/7/2024] tirzepatide-Weight Management (ZEPBOUND) 5 MG/0.5ML prefilled pen     acetaminophen (TYLENOL) 325 MG tablet     No current facility-administered medications for this visit.       Social History     Tobacco Use     Smoking status: Never     Smokeless tobacco: Never       Honoring Choices - Health Care Directive Guide offered to patient at time of visit.    Health Maintenance Due   Topic Date Due     ADVANCE CARE PLANNING  Never done     MAMMO SCREENING  Never done     HEPATITIS B IMMUNIZATION (1 of 3 - 3-dose series) Never done     HIV SCREENING  Never done     HEPATITIS C SCREENING  Never done     ZOSTER IMMUNIZATION (2 of 2) 01/17/2023     COVID-19 Vaccine (5 - 2023-24  "season) 09/01/2023     PAP  05/06/2024       Immunization History   Administered Date(s) Administered     COVID-19 Bivalent 12+ (Pfizer) 09/27/2022     COVID-19 Monovalent 18+ (Moderna) 02/25/2021, 03/25/2021, 11/24/2021     Influenza (H1N1) 11/13/2009     Influenza Vaccine >6 months,quad, PF 09/25/2023       No results found for: \"PAP\"    Recent Labs   Lab Test 10/05/23  0715   A1C 6.2*   *   HDL 44*   TRIG 103   ALT 18   CR 0.82   GFRESTIMATED 86   ALBUMIN 4.3   POTASSIUM 4.1   TSH 3.63           1/22/2024     9:51 AM 8/8/2023     3:06 PM   PHQ-2 ( 1999 Pfizer)   Q1: Little interest or pleasure in doing things 0 0   Q2: Feeling down, depressed or hopeless 0 0   PHQ-2 Score 0 0           9/24/2023     9:48 AM   PHQ-9 SCORE   PHQ-9 Total Score MyChart 4 (Minimal depression)   PHQ-9 Total Score 4           9/24/2023     9:49 AM   SILVIA-7 SCORE   Total Score 7 (mild anxiety)   Total Score 7            No data to display                Dinorah Wen, EMT    January 22, 2024 9:53 AM    "

## 2024-05-21 ENCOUNTER — VIRTUAL VISIT (OUTPATIENT)
Dept: ENDOCRINOLOGY | Facility: CLINIC | Age: 54
End: 2024-05-21
Payer: COMMERCIAL

## 2024-05-21 VITALS — BODY MASS INDEX: 36.7 KG/M2 | WEIGHT: 215 LBS | HEIGHT: 64 IN

## 2024-05-21 DIAGNOSIS — E66.812 CLASS 2 SEVERE OBESITY WITH SERIOUS COMORBIDITY AND BODY MASS INDEX (BMI) OF 36.0 TO 36.9 IN ADULT, UNSPECIFIED OBESITY TYPE (H): Primary | ICD-10-CM

## 2024-05-21 DIAGNOSIS — E66.01 CLASS 2 SEVERE OBESITY WITH SERIOUS COMORBIDITY AND BODY MASS INDEX (BMI) OF 36.0 TO 36.9 IN ADULT, UNSPECIFIED OBESITY TYPE (H): Primary | ICD-10-CM

## 2024-05-21 PROCEDURE — G2211 COMPLEX E/M VISIT ADD ON: HCPCS | Mod: 95

## 2024-05-21 PROCEDURE — 99213 OFFICE O/P EST LOW 20 MIN: CPT | Mod: 95

## 2024-05-21 RX ORDER — SEMAGLUTIDE 0.5 MG/.5ML
0.5 INJECTION, SOLUTION SUBCUTANEOUS WEEKLY
Qty: 2 ML | Refills: 2 | Status: SHIPPED | OUTPATIENT
Start: 2024-06-20 | End: 2024-08-27

## 2024-05-21 RX ORDER — SEMAGLUTIDE 0.25 MG/.5ML
0.25 INJECTION, SOLUTION SUBCUTANEOUS WEEKLY
Qty: 2 ML | Refills: 0 | Status: SHIPPED | OUTPATIENT
Start: 2024-05-21 | End: 2024-08-27

## 2024-05-21 NOTE — PROGRESS NOTES
Virtual Visit Details    Type of service:  Video Visit     Originating Location (pt. Location): Home    Distant Location (provider location):  Off-site  Platform used for Video Visit: [facility] Medical Weight Management Note     Steve Aggarwal  MRN:  [id number]  :  1970  RUSSEL:  2024    Dear BETO Blackwood CNP,    I had the pleasure of seeing your patient Steve Aggarwal. She is a 54 year old female who I am continuing to see for treatment of obesity related to:        2024     8:22 PM   --   I have the following health issues associated with obesity Pre-Diabetes    Fatty Liver   I have the following symptoms associated with obesity Knee Pain    Fatigue    Hip Pain       Assessment & Plan   Problem List Items Addressed This Visit       Class 2 severe obesity with serious comorbidity and body mass index (BMI) of 36.0 to 36.9 in adult (H) - Primary     Last seen for New MWM. GLP-1 not covered. Discussed starting phentermine or metformin at that time, but decided to not start any medications with insurance changes.     Would like to start GLP-1 if covered by new insurance. Will put in Wegovy. If not covered will start Topiramate. No symptoms from concussion over 10 months ago. No contraindications. Discussed side effects, risks, and benefits. Concern for starting phentermine today with increase stress.            Relevant Medications    Semaglutide-Weight Management (WEGOVY) 0.25 MG/0.5ML pen    Semaglutide-Weight Management (WEGOVY) 0.5 MG/0.5ML pen (Start on 2024)    Other Relevant Orders    Med Therapy Management Referral      Start Wegovy 0.25mg once weekly for 4 weeks, then increase to 0.5mg once weekly. Consider Zepbound and Saxenda as needed. If not covered will start Topiramate.   MTM pharmacist in 6 weeks   Jessica Nuno or Claudia in 3 months   Yuly Hemphill PA-C in 6 months     INTERVAL HISTORY:  New MWM on 2024  GLP-1 not covered by insurance.         Since last visit  got new insurance, so wanted to wait until got new insurance to see what got covered. Did not start any AOMs since our last visit.     Anti-obesity medication history      Past/Failed/contraindicated:   Phentermine can be considered. Some concern with increase in anxiety. But, mood is currently stable on medications. Discussed risk of serotonin syndrome. No hx of HTN. Would need to monitor mood if started.   Topiramate concern with brain fog after concussion 6 months ago. No hx of kidney disease or kidney stone.       Recent exercise/activity changes: has been on bike more. Trying to be more active.     Recent stressors: Increase stress with family.         CURRENT WEIGHT:   215 lbs 0 oz                      5/20/2024    11:50 AM   Changes and Difficulties   I have made the following changes to my diet since my last visit: My awareness of what I am eating has increased.   With regards to my diet, I am still struggling with: Getting enough vegetables and PLANNING. Ugh   I have made the following changes to my activity/exercise since my last visit: I am moving my body every day   With regards to my activity/exercise, I am still struggling with: Intensity of workouts         MEDICATIONS:   Current Outpatient Medications   Medication Sig Dispense Refill    finasteride (PROSCAR) 5 MG tablet 1/2 TABLET DAILY BY MOUTH      Semaglutide-Weight Management (WEGOVY) 0.25 MG/0.5ML pen Inject 0.25 mg Subcutaneous once a week For 4 weeks 2 mL 0    [START ON 6/20/2024] Semaglutide-Weight Management (WEGOVY) 0.5 MG/0.5ML pen Inject 0.5 mg Subcutaneous once a week After completing 4 weeks of 0.25mg dose 2 mL 2    PARoxetine (PAXIL) 30 MG tablet Take 1 tablet (30 mg) by mouth every morning 7 tablet 0           5/20/2024    11:50 AM   Weight Loss Medication History Reviewed With Patient   Which weight loss medications are you currently taking on a regular basis? None   If you are not taking a weight loss medication that was prescribed  "to you, please indicate why: My insurance does not cover the cost   Are you having any side effects from the weight loss medication that we have prescribed you? No       PHYSICAL EXAM:  Objective    Ht 1.626 m (5' 4.02\")   Wt 97.5 kg (215 lb)   BMI 36.89 kg/m             Vitals:  No vitals were obtained today due to virtual visit.    GENERAL: alert and no distress  EYES: Eyes grossly normal to inspection.  No discharge or erythema, or obvious scleral/conjunctival abnormalities.  RESP: No audible wheeze, cough, or visible cyanosis.    SKIN: Visible skin clear. No significant rash, abnormal pigmentation or lesions.  NEURO: Cranial nerves grossly intact.  Mentation and speech appropriate for age.  PSYCH: Appropriate affect, tone, and pace of words        Sincerely,    Yuly Don PA-C      25 minutes spent by me on the date of the encounter doing chart review, history and exam, documentation and further activities per the note    The longitudinal plan of care for the diagnosis(es)/condition(s) as documented were addressed during this visit. Due to the added complexity in care, I will continue to support Steve in the subsequent management and with ongoing continuity of care.  "

## 2024-05-21 NOTE — NURSING NOTE
Is the patient currently in the state of [place]? YES    Visit mode:VIDEO    If the visit is dropped, the patient can be reconnected by: VIDEO VISIT: Text to cell phone:   Telephone Information:   Mobile [phone]   Mobile Not on file.       Will anyone else be joining the visit? NO  (If patient encounters technical issues they should call [phone] :[phone])    How would you like to obtain your AVS? MyChart    Are changes needed to the allergy or medication list? No, Pt stated no changes to allergies, and Pt stated no med changes    Are refills needed on medications prescribed by this physician? NO    Reason for visit: RECHECK (LUCILA)    Irene BENITEZ

## 2024-05-21 NOTE — LETTER
2024       RE: Steve Aggarwal  [address]  [facility] [address]     Dear Colleague,    Thank you for referring your patient, Steve Aggarwal, to the [facility] [place] at [facility]. Please see a copy of my visit note below.    Virtual Visit Details    Type of service:  Video Visit     Originating Location (pt. Location): Home    Distant Location (provider location):  Off-site  Platform used for Video Visit: [facility] Weight Management Note     Steve Aggarwal  MRN:  [id number]  :  1970  RUSSEL:  2024    Dear BETO Blackwood CNP,    I had the pleasure of seeing your patient Steve Aggarwal. She is a 54 year old female who I am continuing to see for treatment of obesity related to:        2024     8:22 PM   --   I have the following health issues associated with obesity Pre-Diabetes    Fatty Liver   I have the following symptoms associated with obesity Knee Pain    Fatigue    Hip Pain       Assessment & Plan  Problem List Items Addressed This Visit       Class 2 severe obesity with serious comorbidity and body mass index (BMI) of 36.0 to 36.9 in adult (H) - Primary     Last seen for New M. GLP-1 not covered. Discussed starting phentermine or metformin at that time, but decided to not start any medications with insurance changes.     Would like to start GLP-1 if covered by new insurance. Will put in Wegovy. If not covered will start Topiramate. No symptoms from concussion over 10 months ago. No contraindications. Discussed side effects, risks, and benefits. Concern for starting phentermine today with increase stress.            Relevant Medications    Semaglutide-Weight Management (WEGOVY) 0.25 MG/0.5ML pen    Semaglutide-Weight Management (WEGOVY) 0.5 MG/0.5ML pen (Start on 2024)    Other Relevant Orders    Med Therapy Management Referral      Start Wegovy 0.25mg once  weekly for 4 weeks, then increase to 0.5mg once weekly. Consider Zepbound and Saxenda as needed. If not covered will start Topiramate.   MTM pharmacist in 6 weeks   Jessica Nuno or Claudia in 3 months   Yuly Hemphill PA-C in 6 months     INTERVAL HISTORY:  New MWM on 1/8/2024  GLP-1 not covered by insurance.         Since last visit got new insurance, so wanted to wait until got new insurance to see what got covered. Did not start any AOMs since our last visit.     Anti-obesity medication history      Past/Failed/contraindicated:   Phentermine can be considered. Some concern with increase in anxiety. But, mood is currently stable on medications. Discussed risk of serotonin syndrome. No hx of HTN. Would need to monitor mood if started.   Topiramate concern with brain fog after concussion 6 months ago. No hx of kidney disease or kidney stone.       Recent exercise/activity changes: has been on bike more. Trying to be more active.     Recent stressors: Increase stress with family.         CURRENT WEIGHT:   215 lbs 0 oz                      5/20/2024    11:50 AM   Changes and Difficulties   I have made the following changes to my diet since my last visit: My awareness of what I am eating has increased.   With regards to my diet, I am still struggling with: Getting enough vegetables and PLANNING. Ugh   I have made the following changes to my activity/exercise since my last visit: I am moving my body every day   With regards to my activity/exercise, I am still struggling with: Intensity of workouts         MEDICATIONS:   Current Outpatient Medications   Medication Sig Dispense Refill    finasteride (PROSCAR) 5 MG tablet 1/2 TABLET DAILY BY MOUTH      Semaglutide-Weight Management (WEGOVY) 0.25 MG/0.5ML pen Inject 0.25 mg Subcutaneous once a week For 4 weeks 2 mL 0    [START ON 6/20/2024] Semaglutide-Weight Management (WEGOVY) 0.5 MG/0.5ML pen Inject 0.5 mg Subcutaneous once a week After completing 4 weeks of 0.25mg dose 2 mL  "2    PARoxetine (PAXIL) 30 MG tablet Take 1 tablet (30 mg) by mouth every morning 7 tablet 0           5/20/2024    11:50 AM   Weight Loss Medication History Reviewed With Patient   Which weight loss medications are you currently taking on a regular basis? None   If you are not taking a weight loss medication that was prescribed to you, please indicate why: My insurance does not cover the cost   Are you having any side effects from the weight loss medication that we have prescribed you? No       PHYSICAL EXAM:  Objective   Ht 1.626 m (5' 4.02\")   Wt 97.5 kg (215 lb)   BMI 36.89 kg/m             Vitals:  No vitals were obtained today due to virtual visit.    GENERAL: alert and no distress  EYES: Eyes grossly normal to inspection.  No discharge or erythema, or obvious scleral/conjunctival abnormalities.  RESP: No audible wheeze, cough, or visible cyanosis.    SKIN: Visible skin clear. No significant rash, abnormal pigmentation or lesions.  NEURO: Cranial nerves grossly intact.  Mentation and speech appropriate for age.  PSYCH: Appropriate affect, tone, and pace of words        Sincerely,    Yuly Don PA-C      25 minutes spent by me on the date of the encounter doing chart review, history and exam, documentation and further activities per the note    The longitudinal plan of care for the diagnosis(es)/condition(s) as documented were addressed during this visit. Due to the added complexity in care, I will continue to support Steve in the subsequent management and with ongoing continuity of care.    "

## 2024-05-26 DIAGNOSIS — F41.9 ANXIETY AND DEPRESSION: ICD-10-CM

## 2024-05-26 DIAGNOSIS — F32.A ANXIETY AND DEPRESSION: ICD-10-CM

## 2024-05-28 ENCOUNTER — TELEPHONE (OUTPATIENT)
Dept: ENDOCRINOLOGY | Facility: CLINIC | Age: 54
End: 2024-05-28
Payer: COMMERCIAL

## 2024-05-28 NOTE — TELEPHONE ENCOUNTER
Left Voicemail (1st Attempt) and Sent Mychart (1st Attempt) for the patient to call back and schedule the following:    Appointment type: RET MWM   Provider: Lou Machado or Jessica   Return date: 3 mos 9 08/21/2024 )   Specialty phone number: [phone]  Additional appointment(s) needed: yes  Additonal Notes: ANA ROSA GAYTAN, Yuly Don PA-C,follow-up 6 mo ( 02/2025 )

## 2024-05-31 ENCOUNTER — TELEPHONE (OUTPATIENT)
Dept: FAMILY MEDICINE | Facility: CLINIC | Age: 54
End: 2024-05-31

## 2024-05-31 DIAGNOSIS — F32.A ANXIETY AND DEPRESSION: ICD-10-CM

## 2024-05-31 DIAGNOSIS — F41.9 ANXIETY AND DEPRESSION: ICD-10-CM

## 2024-05-31 RX ORDER — PAROXETINE 30 MG/1
30 TABLET, FILM COATED ORAL EVERY MORNING
Qty: 90 TABLET | Refills: 0 | Status: SHIPPED | OUTPATIENT
Start: 2024-05-31 | End: 2024-05-31

## 2024-05-31 RX ORDER — PAROXETINE 30 MG/1
30 TABLET, FILM COATED ORAL EVERY MORNING
Qty: 7 TABLET | Refills: 0 | Status: SHIPPED | OUTPATIENT
Start: 2024-05-31 | End: 2024-08-31

## 2024-05-31 RX ORDER — PAROXETINE 30 MG/1
30 TABLET, FILM COATED ORAL EVERY MORNING
Qty: 90 TABLET | Refills: 1 | Status: CANCELLED | OUTPATIENT
Start: 2024-05-31

## 2024-05-31 NOTE — TELEPHONE ENCOUNTER
PARoxetine (PAXIL) 30 MG tablet   Disp-90 tablet, R-1,   Start: 10/27/2023     1/22/2024  [facility] Shirlene Mcgraw NP  Family Medicine    Routed because: request per pt call.  SSRIs Protocol [id number]/26/2024 07:38 AM   Protocol Details PHQ-9 score less than 5 in past 6 months

## 2024-05-31 NOTE — TELEPHONE ENCOUNTER
[facility] Medicine phone call message - patient requesting a refill:    Full Medication Name: PARoxetine     Dose: 30 MG      Pharmacy confirmed as   [id number] - [place], [place] - [address]  [address]  [place] [address]  Phone: [phone] Fax: [phone]  : Yes    Medication tab checked to see if medication has been sent  Yes    Is patient out of medication: Yes    Did patient contact the pharmacy? Yes    Additional Comments:     Would the patient like to be updated? Phone Call to number confirmed below  If a phone call, is it okay to leave a detailed message?: Yes  at Cell number on file:    Telephone Information:   Mobile [phone]   Mobile Not on file.        Primary language: English      needed? No    Call taken on May 31, 2024 at 1:34 PM by Berny Damon    Route to [facility] MED REFILLS TEAM     ++If medication is a controlled substance, please route directly to the provider++

## 2024-05-31 NOTE — TELEPHONE ENCOUNTER
PARoxetine (PAXIL) 30 MG tablet   Disp-90 tablet, R-1,   Start: 10/27/2023     Addressed in a different encounter.

## 2024-05-31 NOTE — TELEPHONE ENCOUNTER
Patient is calling to find out if she actually needs a follow up appointment. She states that she has been on this medication for over 15 years and is not having any issues. She just wants to come in on an annual basis for it. Does the provider need a follow-up visit scheduled?

## 2024-05-31 NOTE — TELEPHONE ENCOUNTER
[facility] Family Medicine phone call message - patient requesting a refill:    Full Medication Name: Paxil     Dose: 30 MG     Pharmacy confirmed as   [id number] - [place], [place] - [address]  [address]  [place] [address]  Phone: [phone] Fax: [phone]  : Yes    Medication tab checked to see if medication has been sent  Yes    Is patient out of medication: Yes    Did patient contact the pharmacy? Yes    Additional Comments: Patient needs a weekend supply until her refills are delivered      Would the patient like to be updated? Phone Call to number confirmed below  If a phone call, is it okay to leave a detailed message?: Yes  at Cell number on file:    Telephone Information:   Mobile [phone]   Mobile Not on file.        Primary language: English      needed? No    Call taken on May 31, 2024 at 4:49 PM by Berny Damon    Route to [facility] MED REFILLS TEAM     ++If medication is a controlled substance, please route directly to the provider++

## 2024-06-03 NOTE — TELEPHONE ENCOUNTER
Spoke with patient and relayed providers message. Ok to be seen on an annual basis if symptoms are stable.     Agustina Schawrtz, CMA

## 2024-06-03 NOTE — PATIENT INSTRUCTIONS
"Sanju Wilson,   Thank you for allowing us the privilege of caring for you. We hope we provided you with the excellent service you deserve.   Please let us know if there is anything else we can do for you so that we can be sure you are completely satisfied with your care experience.    To ensure the quality of our services you may be receiving a patient satisfaction survey from an independent patient satisfaction monitoring company.    The greatest compliment you can give is a \"Likely to Recommend\"    Your visit was with Yuly Don PA-C today.    Instructions per today's visit:     Start Wegovy 0.25mg once weekly for 4 weeks, then increase to 0.5mg once weekly. Consider Zepbound and Saxenda as needed. If not covered will start Topiramate.   MTM pharmacist in 6 weeks   Jessica Nuno or Claudia in 3 months   Yuly Hemphill PA-C in 6 months     ___________________________________________________________________________  Important contact and scheduling information:  Please call our contact center at [phone] to schedule your next appointments.  For any nursing questions or concerns call Heidy Santana LPN at [phone] or Carly Gutierres RN at [phone]  Please call during clinic hours Monday through Friday 8:00a - 4:00p if you have questions or you can contact us via Hipscan at anytime and we will reply during clinic hours.    Lab results will be communicated through My Chart or letter (if My Chart not used). Please call the clinic if you have not received communication after 1 week or if you have any questions.?  Clinic Fax: [phone]  __________________________________________________________________________    If labs were ordered today:    Please make an appointment to have them drawn at your convenience.     To schedule the Lab Appointment using Hipscan:  Select \"Schedule an Appointment\"  Select \"Lab Only\"  For \"A couple of questions\", select \"Other\"  For \"Which locations work for you?, select the location " and set up the appointment    To schedule by phone call [phone] to schedule a lab only appointment at any [facility] lab.  ___________________________________________________________________________  Work with A Health !  Virtual Sessions are Available through [facility] Weight Management Clinics    To learn more, call to schedule a free, Health  Q&A appointment: [phone]     What is Health Coaching?  Do you know what you are supposed to do, but you just aren't doing it?  Then, HEALTH COACHING may help you!   Get unstuck and move forward with the support of a professionally trained NBC-HWC (National Board-Certified Health and ) who uses evidence-based approaches to help you move forward with healthy lifestyle changes in the areas of weight loss, stress management and overall well-being.    Health Coaches help you identify goals that will work best for you. Health Coaches provide support and encouragement with overcoming barriers and help you to find inspiration and motivation to lead a healthy lifestyle.    Option one:  Health Coaching 3-Pack; Three, 30-minute Health Coaching Visits, for $99  Visits are done virtually (phone or video)  This is a self pay service; we do not accept insurance for maikel coaching.    Option two:   The 24 week Plan; 11 Health Coaching Visits, and a 7 months subscription to [employer]-- on-demand fitness, nutrition and mindfulness classes, for $499 (employee discounts may be available). Participants will also meet regularly with a weight management Medical Provider and a Registered/Licensed Dietician.  This is a self-pay service; we do not accept insurance for health coaching.    To Schedule a free Health  Q&A appointment to learn more,  call [phone].  ____________________________________________________________________  [facility]  Healthy Lifestyle Group    Healthy Lifestyle Group  This  "is a 60 minute virtual coaching group for those who want to lead a healthier lifestyle. Come together to set goals and overcome barriers in a supportive group environment. We will address the four pillars of health--nutrition, exercise, sleep and emotional well-being.  This group is highly recommended for those who are participating in the 24 week Healthy Lifestyle Plan and our Health Coaching sessions.    WHEN: This group meets the first Friday of the month, 12:30 PM - 1:30 PM online, via a zoom meeting.      FACILITATOR: Led by National Board Certified Health and , Dinorah Madden, [facility].    TO REGISTER: Please call the Call Center at [phone] to register. You will get an appointment to attend in My [employer]ards. Fifteen minutes prior to the meeting, complete the e-check in and you will get the link to join the meeting.  There is no charge to attend this group and space is limited.      2023 and 2024 Meeting Topics and Dates:    November 3: Introduction to Mindfulness (Learn simple and effective mindfulness practices and how it can benefit you)    December 8: Let's Talk (guided discussion on our wins and challenges)    January 5: New Years Vision: Manifest your Best 2024! (Guided imagery,  journaling and discussion)    February 2: Let's Talk    March 1: 10 Percent Happier by Girma Alvarado (Book Bites; a guided discussion on the nuggets of wisdom from favorite wellness books; no need to read the book but highly encouraged)    April 5: Let's Talk    May 3: \"Essentialism; The Disciplined Pursuit of Less by Al Lamb (book bites discussion)    June 7: Let's Talk    July 5: NO MEETING, off for the 4th of July Holiday    August 2: The Blue Zones, Secrets for Living a Longer Life by Girma Bar (book bites discussion)      If you would like bariatric surgery specific support group info please let your care team know.         Thank you,   [facility] Comprehensive Weight Management Team    WEGOVY " (semaglutide)    What is Wegovy?    Wegovy (semaglutide) injection 2.4 mg is an injectable prescription medicine used for adults with obesity (BMI ?30) or overweight (excess weight) (BMI ?27) who also have weight-related medical problems to help them lose weight and keep the weight off.    1.  Start Wegovy (semaglutide) 0.25 mg once weekly for 4 weeks, then if tolerating increase to 0.5 mg weekly for 4 weeks, then if tolerating increase to 1 mg weekly for 4 weeks, then if tolerating increase to 1.7 mg weekly for 4 weeks, then if tolerating increase to 2.4 mg weekly thereafter.    -Each Wegovy pen is a once weekly single-dose prefilled pen with a pen injector already built within the pen. Discard the Wegovy pen after use in sharps container.     2. Storage: make sure that when you get the prescription that you store the prescription in the refrigerator until it is time to use the Wegovy pen.  Once it is time to use the Wegovy pen, you can keep the pen at room temperature and it is good for up to 28 days at room temperature.     3.  Potential common side effects: nausea, headache, diarrhea, stomach upset.  If these become unmanageable or concerning symptoms, please make sure to call or mychart.    4. Wegovy should be discontinued for ?2 months prior to becoming pregnant.     Prior Authorization Process for Weight Management Medications: You are being prescribed a medication that will likely need to undergo a prior authorization.  A prior authorization is when the clinic needs to fill out a form that is sent to your insurance company to obtain coverage for that medication. The prescription will NOT automatically go to your pharmacy today if it needs a Prior Authorization. If the medication prior authorization is approved, the care team will contact you and the prescription will be released to your pharmacy. If denied, we will work to try and appeal the prior authorization if possible. The initial prior authorization  process takes up to 5-10 business days and appeals can take up to 30 days. If you do not hear from us at the end of that time, you may contact the clinic.    Go to site: Wegovy video to learn more and watch instruction videos.    For any questions or concerns please send a Money Dashboard message to our team or call our weight management call center at [phone] during regular business hours. For questions during evenings or weekends your messages will be addressed during the next business day.  For emergencies please call 911 or seek immediate medical care.

## 2024-06-03 NOTE — ASSESSMENT & PLAN NOTE
Last seen for New MWM. GLP-1 not covered. Discussed starting phentermine or metformin at that time, but decided to not start any medications with insurance changes.     Would like to start GLP-1 if covered by new insurance. Will put in Wegovy. If not covered will start Topiramate. No symptoms from concussion over 10 months ago. No contraindications. Discussed side effects, risks, and benefits. Concern for starting phentermine today with increase stress.

## 2024-06-11 DIAGNOSIS — E66.01 CLASS 2 SEVERE OBESITY WITH SERIOUS COMORBIDITY AND BODY MASS INDEX (BMI) OF 36.0 TO 36.9 IN ADULT, UNSPECIFIED OBESITY TYPE (H): Primary | ICD-10-CM

## 2024-06-11 DIAGNOSIS — E66.812 CLASS 2 SEVERE OBESITY WITH SERIOUS COMORBIDITY AND BODY MASS INDEX (BMI) OF 36.0 TO 36.9 IN ADULT, UNSPECIFIED OBESITY TYPE (H): Primary | ICD-10-CM

## 2024-08-14 ENCOUNTER — THERAPY VISIT (OUTPATIENT)
Dept: PHYSICAL THERAPY | Facility: CLINIC | Age: 54
End: 2024-08-14
Payer: COMMERCIAL

## 2024-08-14 DIAGNOSIS — M54.12 CERVICAL RADICULOPATHY: Primary | ICD-10-CM

## 2024-08-14 PROCEDURE — 97161 PT EVAL LOW COMPLEX 20 MIN: CPT | Mod: GP | Performed by: PHYSICAL THERAPIST

## 2024-08-14 PROCEDURE — 97110 THERAPEUTIC EXERCISES: CPT | Mod: GP | Performed by: PHYSICAL THERAPIST

## 2024-08-14 PROCEDURE — 97112 NEUROMUSCULAR REEDUCATION: CPT | Mod: GP | Performed by: PHYSICAL THERAPIST

## 2024-08-14 ASSESSMENT — ACTIVITIES OF DAILY LIVING (ADL)
WHEN_LYING_ON_THE_INVOLVED_SIDE: 5
PUTTING_ON_YOUR_PANTS: 0
CARRYING_A_HEAVY_OBJECT_OF_10_POUNDS: 3
PLACING_AN_OBJECT_ON_A_HIGH_SHELF: 0
PUSHING_WITH_THE_INVOLVED_ARM: 3
WASHING_YOUR_HAIR?: 0
REMOVING_SOMETHING_FROM_YOUR_BACK_POCKET: 0
PLEASE_INDICATE_YOR_PRIMARY_REASON_FOR_REFERRAL_TO_THERAPY:: SHOULDER
AT_ITS_WORST?: 7
REACHING_FOR_SOMETHING_ON_A_HIGH_SHELF: 1
PUTTING_ON_AN_UNDERSHIRT_OR_A_PULLOVER_SWEATER: 0
TOUCHING_THE_BACK_OF_YOUR_NECK: 0
WASHING_YOUR_BACK: 0
PUTTING_ON_A_SHIRT_THAT_BUTTONS_DOWN_THE_FRONT: 0
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
## 2024-08-15 PROBLEM — M54.12 CERVICAL RADICULOPATHY: Status: ACTIVE | Noted: 2024-08-15

## 2024-08-27 ENCOUNTER — VIRTUAL VISIT (OUTPATIENT)
Dept: CARDIOLOGY | Facility: CLINIC | Age: 54
End: 2024-08-27
Payer: COMMERCIAL

## 2024-08-27 DIAGNOSIS — E66.812 CLASS 2 SEVERE OBESITY WITH SERIOUS COMORBIDITY AND BODY MASS INDEX (BMI) OF 36.0 TO 36.9 IN ADULT (H): Primary | ICD-10-CM

## 2024-08-27 DIAGNOSIS — F41.1 ANXIETY STATE: ICD-10-CM

## 2024-08-27 DIAGNOSIS — E66.01 CLASS 2 SEVERE OBESITY WITH SERIOUS COMORBIDITY AND BODY MASS INDEX (BMI) OF 36.0 TO 36.9 IN ADULT (H): Primary | ICD-10-CM

## 2024-08-27 DIAGNOSIS — L65.9 HAIR THINNING: ICD-10-CM

## 2024-08-27 NOTE — LETTER
8/27/2024      RE: Steve Aggarwal  [address]  [facility] [address]       Dear Colleague,    Thank you for the opportunity to participate in the care of your patient, Steve Aggarwal, at the [facility] [place] [facility]. Please see a copy of my visit note below.    Medication Therapy Management (MTM) Encounter    ASSESSMENT:                            Medication Adherence/Access: No issues identified    Weight management:   Benefit from 0.25 mg dose recognized by patient, uncommon reaction of extreme fatigue with 0.5 mg dose. Reasonable to stay at current dose given experienced benefits or trial dose increase to 0.5 mg with longer exposure to drug in system.   Irregular bowel habits could be exacerbated with increased dose. Laxative likely not necessary given softness of stool. More consistent fiber intake throughout the day could help with regularity. Consult team members for more ideas.     Hair thinning:  Stable.     Mental health:   Stable.     PLAN:                            Take metamucil 1 scoop twice daily to spread out fiber intake.   Pharmacist to follow up with [facility] team on other recommendations for regulating bowel movements.   Continue 0.25 mg semaglutide weekly for 1-2 more weeks, then increase dose to 0.5 mg weekly. Update pharmacist with dose increase.     Follow-up: reach out to Alison via [employer] within 4 weeks with update on dose increase, 2 month follow up scheduled on 10/28/2024 at 9am    SUBJECTIVE/OBJECTIVE:                          Steve Aggarwal is a 54 year old female seen for an initial visit. She was referred to me from Yuly Don.      Reason for visit: compounded semaglutide follow-up.    Allergies/ADRs: Reviewed in chart  Past Medical History: Reviewed in chart  Tobacco: She reports that she has never smoked. She has never used smokeless tobacco.  Alcohol: not  "currently using    Medication Adherence/Access: no issues reported    Weight Management  Wegovy 0.25 mg once weekly (compounded), taking on Mondays, feeling more energy since starting medication. Able to move body more and differently, increased activity and workouts during the week. Working toward adding strength training and pilates more regularly.     Took 0.5 mg x1 dose - experienced significant fatigue with 0.5 mg dose. Very aware of body and changes, this fatigue felt different than just being tired from lack of sleep. Also experience headache on top of head. Does not feel these things with 0.25 mg dose.    Take at night before bed to avoid nausea with dose.  Hx of irregular bowel patterns - has had diarrhea, hemorrhoids in the past. Experiencing some constipation with GLP1. BM 2-3x per week. Soft stool. No abdominal pain. Taking Metamucil 2 scoops once daily. Seems to help with bulking. 2 episodes of vomiting with diarrhea since starting medication, has experienced in the past. Describes adequate water intake     Initial Consult Weight: 215 lbs (5/21/2024)     Current weight today: 204 lbs (patient reported)  Cumulative Weight Loss: -11 lb, -5 % from baseline    Wt Readings from Last 4 Encounters:   05/21/24 97.5 kg (215 lb)   01/22/24 93.9 kg (207 lb)   01/08/24 96.7 kg (213 lb 1.6 oz)   10/12/23 94.8 kg (209 lb)     Estimated body mass index is 36.89 kg/m  as calculated from the following:    Height as of 5/21/24: 1.626 m (5' 4.02\").    Weight as of 5/21/24: 97.5 kg (215 lb).      Hair thinning:  Finasteride 5 mg, 1/2 tablet daily   No concerns     Mental health:   Paroxetine 30 mg once daily.   Following closely with psychiatry.     Today's Vitals: There were no vitals taken for this visit.  ----------------    I spent 40 minutes with this patient today. All changes were made via collaborative practice agreement with Yuly Don. A copy of the visit note was provided to the patient's provider(s).    A " summary of these recommendations was sent via clinic portal.    Alison Mccoy, PharmD, BCACP  Medication Therapy Management (MTM) Pharmacist   [facility]      Telemedicine Visit Details  Type of service:  Video Conference via [employer]  Start Time:  8:30 AM  End Time: 9:10 AM     Medication Therapy Recommendations  Class 2 severe obesity with serious comorbidity and body mass index (BMI) of 36.0 to 36.9 in adult (H)    Current Medication: COMPOUNDED NON-CONTROLLED SUBSTANCE (CMPD RX) - PHARMACY TO MIX COMPOUNDED MEDICATION   Rationale: Undesirable effect - Adverse medication event - Safety   Recommendation: Provide Education   Status: Patient Agreed - Adherence/Education                Please do not hesitate to contact me if you have any questions/concerns.     Sincerely,     Alison Mccoy, RP

## 2024-08-27 NOTE — PROGRESS NOTES
Medication Therapy Management (MTM) Encounter    ASSESSMENT:                            Medication Adherence/Access: No issues identified    Weight management:   Benefit from 0.25 mg dose recognized by patient, uncommon reaction of extreme fatigue with 0.5 mg dose. Reasonable to stay at current dose given experienced benefits or trial dose increase to 0.5 mg with longer exposure to drug in system.   Irregular bowel habits could be exacerbated with increased dose. Laxative likely not necessary given softness of stool. More consistent fiber intake throughout the day could help with regularity. Consult team members for more ideas.     Hair thinning:  Stable.     Mental health:   Stable.     PLAN:                            Take metamucil 1 scoop twice daily to spread out fiber intake.   Pharmacist to follow up with [facility] team on other recommendations for regulating bowel movements.   Continue 0.25 mg semaglutide weekly for 1-2 more weeks, then increase dose to 0.5 mg weekly. Update pharmacist with dose increase.     Follow-up: reach out to Alison via [employer] within 4 weeks with update on dose increase, 2 month follow up scheduled on 10/28/2024 at 9am    SUBJECTIVE/OBJECTIVE:                          Steve Aggarwal is a 54 year old female seen for an initial visit. She was referred to me from Yuly Don.      Reason for visit: compounded semaglutide follow-up.    Allergies/ADRs: Reviewed in chart  Past Medical History: Reviewed in chart  Tobacco: She reports that she has never smoked. She has never used smokeless tobacco.  Alcohol: not currently using    Medication Adherence/Access: no issues reported    Weight Management   Wegovy 0.25 mg once weekly (compounded), taking on Mondays, feeling more energy since starting medication. Able to move body more and differently, increased activity and workouts during the week. Working toward adding strength training and pilates more regularly.  "    Took 0.5 mg x1 dose - experienced significant fatigue with 0.5 mg dose. Very aware of body and changes, this fatigue felt different than just being tired from lack of sleep. Also experience headache on top of head. Does not feel these things with 0.25 mg dose.    Take at night before bed to avoid nausea with dose.  Hx of irregular bowel patterns - has had diarrhea, hemorrhoids in the past. Experiencing some constipation with GLP1. BM 2-3x per week. Soft stool. No abdominal pain. Taking Metamucil 2 scoops once daily. Seems to help with bulking. 2 episodes of vomiting with diarrhea since starting medication, has experienced in the past. Describes adequate water intake     Initial Consult Weight: 215 lbs (5/21/2024)     Current weight today: 204 lbs (patient reported)  Cumulative Weight Loss: -11 lb, -5 % from baseline    Wt Readings from Last 4 Encounters:   05/21/24 97.5 kg (215 lb)   01/22/24 93.9 kg (207 lb)   01/08/24 96.7 kg (213 lb 1.6 oz)   10/12/23 94.8 kg (209 lb)     Estimated body mass index is 36.89 kg/m  as calculated from the following:    Height as of 5/21/24: 1.626 m (5' 4.02\").    Weight as of 5/21/24: 97.5 kg (215 lb).      Hair thinning:  Finasteride 5 mg, 1/2 tablet daily   No concerns     Mental health:   Paroxetine 30 mg once daily.   Following closely with psychiatry.     Today's Vitals: There were no vitals taken for this visit.  ----------------    I spent 40 minutes with this patient today. All changes were made via collaborative practice agreement with Yuly Don. A copy of the visit note was provided to the patient's provider(s).    A summary of these recommendations was sent via clinic portal.    Alison Mccoy, PharmD, BCACP  Medication Therapy Management (MTM) Pharmacist   [facility]      Telemedicine Visit Details  Type of service:  Video Conference via [employer]  Start Time:  8:30 AM  End Time: 9:10 AM     Medication Therapy " Recommendations  Class 2 severe obesity with serious comorbidity and body mass index (BMI) of 36.0 to 36.9 in adult (H)    Current Medication: COMPOUNDED NON-CONTROLLED SUBSTANCE (CMPD RX) - PHARMACY TO MIX COMPOUNDED MEDICATION   Rationale: Undesirable effect - Adverse medication event - Safety   Recommendation: Provide Education   Status: Patient Agreed - Adherence/Education

## 2024-08-27 NOTE — PATIENT INSTRUCTIONS
"Recommendations from today's MTM visit:                                                      Take metamucil 1 scoop twice daily to spread out fiber intake.   Pharmacist to follow up with [facility] team on other recommendations for regulating bowel movements.   Continue 0.25 mg semaglutide weekly for 1-2 more weeks, then increase dose to 0.5 mg weekly. Update pharmacist with dose increase.     Follow-up: reach out to Alison via [employer] within 4 weeks with update on dose increase, 2 month follow up scheduled on 10/28/2024 at 9am    It was great speaking with you today.  I value your experience and would be very thankful for your time in providing feedback in our clinic survey. In the next few days, you may receive an email or text message from [employer] with a link to a survey related to your  clinical pharmacist.\"     To schedule another MTM appointment, please call the clinic directly or you may call the MTM scheduling line at [phone] or toll-free at [phone].     My Clinical Pharmacist's contact information:                                                      Please feel free to contact me with any questions or concerns you have.      Alison Mccoy, PharmD, BCACP  Medication Therapy Management (MTM) Pharmacist   [facility]     "

## 2024-08-31 DIAGNOSIS — F41.9 ANXIETY AND DEPRESSION: ICD-10-CM

## 2024-08-31 DIAGNOSIS — F32.A ANXIETY AND DEPRESSION: ICD-10-CM

## 2024-09-03 NOTE — TELEPHONE ENCOUNTER
PARoxetine (PAXIL) 30 MG tablet       Last Written Prescription Date:  5/31/24  Last Fill Quantity: 7,   # refills: 0(weekend supply requested)  Last Office Visit : 9/25/23  Future Office visit:  None    Routing refill request to provider for review/approval because:    SSRIs Protocol [id number]/31/2024 09:27 AM   Protocol Details PHQ-9 score less than 5 in past 6 months    SILVIA-7 score of less than 5 in past 6 months.      PHQ9    4       GAD7    7    on   9/24/23  GAP IN REFILLS  Ruchi ESTEVEZ RN  P Central Nursing/Red Flag Triage & Med Refill Team

## 2024-09-04 RX ORDER — PAROXETINE 30 MG/1
30 TABLET, FILM COATED ORAL EVERY MORNING
Qty: 90 TABLET | Refills: 3 | Status: SHIPPED | OUTPATIENT
Start: 2024-09-04

## 2024-09-09 ENCOUNTER — THERAPY VISIT (OUTPATIENT)
Dept: PHYSICAL THERAPY | Facility: CLINIC | Age: 54
End: 2024-09-09
Payer: COMMERCIAL

## 2024-09-09 DIAGNOSIS — M54.12 CERVICAL RADICULOPATHY: Primary | ICD-10-CM

## 2024-09-09 PROCEDURE — 97110 THERAPEUTIC EXERCISES: CPT | Mod: GP

## 2024-09-09 PROCEDURE — 97140 MANUAL THERAPY 1/> REGIONS: CPT | Mod: GP

## 2024-09-23 ENCOUNTER — THERAPY VISIT (OUTPATIENT)
Dept: PHYSICAL THERAPY | Facility: CLINIC | Age: 54
End: 2024-09-23
Payer: COMMERCIAL

## 2024-09-23 DIAGNOSIS — M54.12 CERVICAL RADICULOPATHY: Primary | ICD-10-CM

## 2024-09-23 PROCEDURE — 97140 MANUAL THERAPY 1/> REGIONS: CPT | Mod: GP

## 2024-09-23 PROCEDURE — 97110 THERAPEUTIC EXERCISES: CPT | Mod: GP

## 2024-10-09 ENCOUNTER — MYC MEDICAL ADVICE (OUTPATIENT)
Dept: PHARMACY | Facility: CLINIC | Age: 54
End: 2024-10-09
Payer: COMMERCIAL

## 2024-10-09 DIAGNOSIS — E66.01 CLASS 2 SEVERE OBESITY WITH SERIOUS COMORBIDITY AND BODY MASS INDEX (BMI) OF 36.0 TO 36.9 IN ADULT, UNSPECIFIED OBESITY TYPE (H): ICD-10-CM

## 2024-10-09 DIAGNOSIS — E66.812 CLASS 2 SEVERE OBESITY WITH SERIOUS COMORBIDITY AND BODY MASS INDEX (BMI) OF 36.0 TO 36.9 IN ADULT, UNSPECIFIED OBESITY TYPE (H): ICD-10-CM

## 2024-10-11 ENCOUNTER — MYC REFILL (OUTPATIENT)
Dept: ENDOCRINOLOGY | Facility: CLINIC | Age: 54
End: 2024-10-11
Payer: COMMERCIAL

## 2024-10-11 DIAGNOSIS — E66.812 CLASS 2 SEVERE OBESITY WITH SERIOUS COMORBIDITY AND BODY MASS INDEX (BMI) OF 36.0 TO 36.9 IN ADULT, UNSPECIFIED OBESITY TYPE (H): ICD-10-CM

## 2024-10-11 DIAGNOSIS — E66.01 CLASS 2 SEVERE OBESITY WITH SERIOUS COMORBIDITY AND BODY MASS INDEX (BMI) OF 36.0 TO 36.9 IN ADULT, UNSPECIFIED OBESITY TYPE (H): ICD-10-CM

## 2024-10-28 ENCOUNTER — VIRTUAL VISIT (OUTPATIENT)
Dept: PHARMACY | Facility: CLINIC | Age: 54
End: 2024-10-28
Payer: COMMERCIAL

## 2024-10-28 VITALS — HEIGHT: 64 IN | WEIGHT: 193.7 LBS | BODY MASS INDEX: 33.07 KG/M2

## 2024-10-28 DIAGNOSIS — E66.09 CLASS 2 OBESITY DUE TO EXCESS CALORIES IN ADULT, UNSPECIFIED BMI, UNSPECIFIED WHETHER SERIOUS COMORBIDITY PRESENT: Primary | ICD-10-CM

## 2024-10-28 DIAGNOSIS — E66.812 CLASS 2 OBESITY DUE TO EXCESS CALORIES IN ADULT, UNSPECIFIED BMI, UNSPECIFIED WHETHER SERIOUS COMORBIDITY PRESENT: Primary | ICD-10-CM

## 2024-10-28 ASSESSMENT — PAIN SCALES - GENERAL: PAINLEVEL_OUTOF10: NO PAIN (0)

## 2024-10-28 NOTE — PROGRESS NOTES
Medication Therapy Management (MTM) Encounter    ASSESSMENT:                            Medication Adherence/Access: No issues identified.    Weight management   Patient would benefit from continuing pharmacotherapy for weight management. Given tolerating well and class II obesity, recommend maintaining GLP1/GIP therapy as data to support most significant weight loss. Patient also realizing benefit from reduction in food noise, increased satiety and reduction of cravings. Education provided on continued dietary and behavioral modifications with focus on protein and water to optimize effectiveness of compounded semaglutide.       PLAN:                            Continue compounded semaglutide 0.5 mg.     Follow-up: 11/21/24 with Jessica Cohen, VIV, PharmD as needed     SUBJECTIVE/OBJECTIVE:                          Steve Aggarwal is a 54 year old female seen for a follow-up visit.       Reason for visit: compounded semaglutide follow-up.    Allergies/ADRs: Reviewed in chart  Past Medical History: Reviewed in chart  Tobacco: She reports that she has never smoked. She has never used smokeless tobacco.  Alcohol: not currently using    Medication Adherence/Access: no issues reported    Weight Management   Wegovy 0.5 mg once weekly (compounded) x 4+ weeks  - taking on Mondays, feeling more energy since starting medication. Able to move body more and differently, increased activity and workouts during the week.  - comfortable with current dose, noticing some increased hunger at end of last month.   - overall hunger is decreased, food noise decreased   - decreased enjoyment in food - eating for necessity vs enjoyment; previously drawn toward savory, rich foods     Movement has decreased some with school year starting, changes in daylight.  - walking, bike riding, pilates - walking has decreased with daylight.   - goal - pilates x1, walking x3, weight training x2 times   - looking into indoor walking, ok with weather changes -  "kash  - pilates studio is close home which she has recognized is important     Needs:   Accountability partners  Planning time     Still hoping to establish with GI provider -   Fiber once daily, recognizing importance of hydration with regular BM.   Noticing knees don't hurt as much, joints feel better     8/27/24 visit:   Took 0.5 mg x1 dose - experienced significant fatigue with 0.5 mg dose. Very aware of body and changes, this fatigue felt different than just being tired from lack of sleep. Also experience headache on top of head. Does not feel these things with 0.25 mg dose.    Take at night before bed to avoid nausea with dose.  Hx of irregular bowel patterns - has had diarrhea, hemorrhoids in the past. Experiencing some constipation with GLP1. BM 2-3x per week. Soft stool. No abdominal pain. Taking Metamucil 2 scoops once daily. Seems to help with bulking. 2 episodes of vomiting with diarrhea since starting medication, has experienced in the past. Describes adequate water intake     Initial Consult Weight: 215 lbs (5/21/2024)       Current weight today: 193 lbs 11.2 oz  Cumulative Weight Loss: -22 lb, -10.2% from baseline    Wt Readings from Last 4 Encounters:   10/28/24 193 lb 11.2 oz (87.9 kg)   05/21/24 215 lb (97.5 kg)   01/22/24 207 lb (93.9 kg)   01/08/24 213 lb 1.6 oz (96.7 kg)     Estimated body mass index is 33.25 kg/m  as calculated from the following:    Height as of this encounter: 5' 4\" (1.626 m).    Weight as of this encounter: 193 lb 11.2 oz (87.9 kg).    Today's Vitals: Ht 5' 4\" (1.626 m)   Wt 193 lb 11.2 oz (87.9 kg)   BMI 33.25 kg/m    ----------------    I spent 34 minutes with this patient today. All changes were made via collaborative practice agreement with Yuly Don. A copy of the visit note was provided to the patient's provider(s).    A summary of these recommendations was sent via clinic portal.    Alison Mccoy, PharmD, BCACP  Medication Therapy Management (MTM) " Pharmacist   [facility]     Telemedicine Visit Details  The patient's medications can be safely assessed via a telemedicine encounter.  Type of service:  Video Conference via [employer]  Originating Location (pt. Location): Home    Distant Location (provider location):  On-site  Start Time: 9:03 AM  End Time: 9:37 AM     Medication Therapy Recommendations  No medication therapy recommendations to display

## 2024-10-28 NOTE — PATIENT INSTRUCTIONS
"Recommendations from today's MTM visit:                                                    Continue compounded semaglutide 0.5 mg.     Follow-up: 11/21/24 with Jessica Cohen CNP      It was great speaking with you today.  I value your experience and would be very thankful for your time in providing feedback in our clinic survey. In the next few days, you may receive an email or text message from [email or web address] with a link to a survey related to your  clinical pharmacist.\"     To schedule another MTM appointment, please call the clinic directly or you may call the MTM scheduling line at [phone] or toll-free at [phone].     My Clinical Pharmacist's contact information:                                                      Please feel free to contact me with any questions or concerns you have.      Alison Mccoy, PharmD, BCACP  Medication Therapy Management (MTM) Pharmacist   [facility]     "

## 2024-10-28 NOTE — NURSING NOTE
Current patient location: [address] [place] [id number]    Is the patient currently in the state of [place]? YES    Visit mode:VIDEO    If the visit is dropped, the patient can be reconnected by: VIDEO VISIT: Text to cell phone:   Telephone Information:   Mobile [phone]   Mobile Not on file.       Will anyone else be joining the visit? NO  (If patient encounters technical issues they should call [phone]    Are changes needed to the allergy or medication list? No    Are refills needed on medications prescribed by this physician? NO    Rooming Documentation:  Not applicable    Reason for visit: Medication Therapy Management    Trevin BENITEZ

## 2024-11-09 ENCOUNTER — HEALTH MAINTENANCE LETTER (OUTPATIENT)
Age: 54
End: 2024-11-09

## 2024-12-09 ENCOUNTER — OFFICE VISIT (OUTPATIENT)
Dept: SURGERY | Facility: CLINIC | Age: 54
End: 2024-12-09
Payer: COMMERCIAL

## 2024-12-09 VITALS
OXYGEN SATURATION: 96 % | TEMPERATURE: 98.8 F | WEIGHT: 193 LBS | HEART RATE: 71 BPM | DIASTOLIC BLOOD PRESSURE: 82 MMHG | HEIGHT: 64 IN | BODY MASS INDEX: 32.95 KG/M2 | SYSTOLIC BLOOD PRESSURE: 132 MMHG

## 2024-12-09 DIAGNOSIS — K64.4 EXTERNAL HEMORRHOID: ICD-10-CM

## 2024-12-09 DIAGNOSIS — K64.2 BLEEDING GRADE III HEMORRHOIDS: Primary | ICD-10-CM

## 2024-12-09 DIAGNOSIS — F41.1 ANXIETY STATE: ICD-10-CM

## 2024-12-09 DIAGNOSIS — Z87.42 HISTORY OF ENDOMETRIOSIS: ICD-10-CM

## 2024-12-09 PROCEDURE — 46600 DIAGNOSTIC ANOSCOPY SPX: CPT | Performed by: STUDENT IN AN ORGANIZED HEALTH CARE EDUCATION/TRAINING PROGRAM

## 2024-12-09 PROCEDURE — 99203 OFFICE O/P NEW LOW 30 MIN: CPT | Mod: 25 | Performed by: STUDENT IN AN ORGANIZED HEALTH CARE EDUCATION/TRAINING PROGRAM

## 2024-12-09 ASSESSMENT — PAIN SCALES - GENERAL: PAINLEVEL_OUTOF10: NO PAIN (0)

## 2024-12-09 NOTE — PATIENT INSTRUCTIONS
Plan:   1. Medical management of hemorrhoids with increased fiber and water intake  2. Daily sitz baths  3. Meet with gastroenterology to evaluate inconsistent bowel habits  4. Return to surgery clinic in 3 months for recheck of hemorrhoids, possible banding

## 2024-12-09 NOTE — PROGRESS NOTES
Surgical Consultation/History and Physical  [facility] Surgery    Steve is seen in consultation for hemorrhoids, at the request of Physician No Ref-Primary.    Chief Complaint:  anal pain    History of Present Illness: Steve Aggarwal is a 54 year old female who presents to surgery clinic for evaluation of anal pain. Symptoms have been present for several years and are associated with  a mass that protrudes from anus, anal bleeding, and pain. Prior episodes in the past involved a mass that she has not tried to reduce. Patient describes blood in stool as bright red and dripping into the toilet bowl. Describes bowel habits as inconsistent, one bowel movement every few days. She denies straining, but states she often has to wipe many times to get clean. Denies any prior episodes of gas or fecal incontinence. Has tried increasing fiber and water intake for management of this in the past.  She states she takes one additional fiber supplement daily. Denies any abdominal pain, nausea, emesis. Has had a colonoscopy before.    Patient Active Problem List   Diagnosis    Anxiety state    History of endometriosis    Pre-diabetes    Class 2 severe obesity with serious comorbidity and body mass index (BMI) of 36.0 to 36.9 in adult (H)    Fatty liver    Cervical radiculopathy       Past Medical History:   Diagnosis Date    Anxiety and depression     NURY (obstructive sleep apnea)        Past Surgical History:   Procedure Laterality Date    HC REMOVE TONSILS/ADENOIDS,<13 Y/O      Description: Tonsillectomy With Adenoidectomy;  Recorded: 2009;    CO REMOVAL OF OVARY/TUBE(S)      Description: Salpingo-oophorectomy;  Recorded: 2009;    ZZC  DELIVERY ONLY      Description:  Section;  Recorded: 2009;       No family history on file.    Social History     Tobacco Use    Smoking status: Never    Smokeless tobacco: Never   Substance Use Topics    Alcohol use: Not on file        History   Drug  "Use Not on file       Current Outpatient Medications   Medication Sig Dispense Refill    COMPOUNDED NON-CONTROLLED SUBSTANCE (CMPD RX) - PHARMACY TO MIX COMPOUNDED MEDICATION Compounded semaglutide 0.5mg subcutaneous injection once weekly 1 mL 1    COMPOUNDED NON-CONTROLLED SUBSTANCE (CMPD RX) - PHARMACY TO MIX COMPOUNDED MEDICATION Compounded semaglutide 0.5mg subcutaneous injection once weekly, after completing 4 weeks of 0.25mg dose 4 each 1    COMPOUNDED NON-CONTROLLED SUBSTANCE (CMPD RX) - PHARMACY TO MIX COMPOUNDED MEDICATION Compounded semaglutide 0.5mg subcutaneous injection once weekly for 4 weeks 1 mL 0    finasteride (PROSCAR) 5 MG tablet 1/2 TABLET DAILY BY MOUTH      PARoxetine (PAXIL) 30 MG tablet Take 1 tablet (30 mg) by mouth every morning. 90 tablet 3    psyllium (METAMUCIL/KONSYL) 58.6 % powder Take 1 teaspoonful by mouth 2 times daily.         Allergies   Allergen Reactions    Erythromycin Base [Erythromycin] Nausea and Vomiting    Sulfa (Sulfonamide Antibiotics) [Sulfa Antibiotics] Swelling       Review of Systems:   10 point ROS negative other than what was listed in HPI    Physical Exam:  /82   Pulse 71   Temp 98.8  F (37.1  C) (Tympanic)   Ht 1.626 m (5' 4\")   Wt 87.5 kg (193 lb)   SpO2 96%   BMI 33.13 kg/m      Constitutional- No acute distress, well nourished, non-toxic  Eyes: Anicteric, no injection.  PERRL  ENT:  Normocephalic, atraumatic, Nose midline, moist mucus membranes  Neck - supple, no LAD  Respiratory- Nonlabored breathing on room air  Cardiovascular - Extremities warm and well perfused  Abdomen - Soft, non-tender  Rectal - external exam with enlarged, nonthrombosed right anterior quadrant external hemorrhoid. Perianal excoriations present. Digital rectal exam with good tone, no masses, guaiac negative. Anoscope exam showed enlarged internal hemorrhoids in right anterior and right posterior quadrant, non-bleeding  Neuro - No focal neuro deficits, Alert and oriented x " 3  Psych: Appropriate mood and affect  Musculoskeletal: Normal gait, symmetric strength.  FROM upper and lower extremities.  Skin: Warm, Dry    Assessment:  1. Steve Aggarwal is a 54 year old female who presents to surgery clinic for evaluation of anal pain and bleeding. These symptoms appear to be consistent with symptomatic internal and external hemorrhoids. They are Grade III based on described symptoms. Explained to the patient the pathophysiology of hemorrhoids and that these are natural collections of submucosal sinusoids within the anorectum.  Symptomatic hemorrhoids can come from chronic irritation that causes vascular congestion and enlargement of hemorrhoids.      I counseled the patient on therapeutic options and explained that, although surgical excision or dearterialization is an option, I recommend starting with nonoperative management. She has infrequent bowel movements with residual stool afterwards, causing perianal excoriations which is likely contributing to symptoms. This has persisted despite trying to increase her fiber intake (takes one supplement daily). I recommend increasing fiber intake to 30 grams daily and continuing water intake of 2-3 L daily. I also recommend daily sitz baths.  In addition, decreasing alcohol and spicy food intake may also help. I also recommend meeting with a gastroenterologist to help determine if there is any other etiology as to why her bowel habits are inconsistent despite increasing fiber intake. We will trial this nonoperative approach for 3 months, and then invite patient back to clinic to reevaluate symptoms.  If still symptomatic at that time, then we can further discuss surgical options for management of the hemorrhoids.  Patient expressed understanding and was amenable to the proposed plan.       Plan:   1. Medical management of hemorrhoids with increased fiber and water intake  2. Daily sitz baths  3. Meet with gastroenterology to evaluate inconsistent  bowel habits  4. Return to surgery clinic in 3 months for recheck of hemorrhoids, possible banding        Manuel Dunn MD  12/9/2024 at 1:44 PM

## 2024-12-09 NOTE — LETTER
2024      Steve Aggarwal  [address]  [facility] [address]      Dear Colleague,    Thank you for referring your patient, Steve Aggarwal, to the [facility]. Please see a copy of my visit note below.    Surgical Consultation/History and Physical  [facility] Surgery    Steve is seen in consultation for hemorrhoids, at the request of Physician No Ref-Primary.    Chief Complaint:  anal pain    History of Present Illness: Steve Aggarwal is a 54 year old female who presents to surgery clinic for evaluation of anal pain. Symptoms have been present for several years and are associated with  a mass that protrudes from anus, anal bleeding, and pain. Prior episodes in the past involved a mass that she has not tried to reduce. Patient describes blood in stool as bright red and dripping into the toilet bowl. Describes bowel habits as inconsistent, one bowel movement every few days. She denies straining, but states she often has to wipe many times to get clean. Denies any prior episodes of gas or fecal incontinence. Has tried increasing fiber and water intake for management of this in the past.  She states she takes one additional fiber supplement daily. Denies any abdominal pain, nausea, emesis. Has had a colonoscopy before.    Patient Active Problem List   Diagnosis     Anxiety state     History of endometriosis     Pre-diabetes     Class 2 severe obesity with serious comorbidity and body mass index (BMI) of 36.0 to 36.9 in adult (H)     Fatty liver     Cervical radiculopathy       Past Medical History:   Diagnosis Date     Anxiety and depression      NURY (obstructive sleep apnea)        Past Surgical History:   Procedure Laterality Date     HC REMOVE TONSILS/ADENOIDS,<11 Y/O      Description: Tonsillectomy With Adenoidectomy;  Recorded: 2009;     IN REMOVAL OF OVARY/TUBE(S)      Description: Salpingo-oophorectomy;  Recorded: 2009;     ZZC  DELIVERY  "ONLY      Description:  Section;  Recorded: 2009;       No family history on file.    Social History     Tobacco Use     Smoking status: Never     Smokeless tobacco: Never   Substance Use Topics     Alcohol use: Not on file        History   Drug Use Not on file       Current Outpatient Medications   Medication Sig Dispense Refill     COMPOUNDED NON-CONTROLLED SUBSTANCE (CMPD RX) - PHARMACY TO MIX COMPOUNDED MEDICATION Compounded semaglutide 0.5mg subcutaneous injection once weekly 1 mL 1     COMPOUNDED NON-CONTROLLED SUBSTANCE (CMPD RX) - PHARMACY TO MIX COMPOUNDED MEDICATION Compounded semaglutide 0.5mg subcutaneous injection once weekly, after completing 4 weeks of 0.25mg dose 4 each 1     COMPOUNDED NON-CONTROLLED SUBSTANCE (CMPD RX) - PHARMACY TO MIX COMPOUNDED MEDICATION Compounded semaglutide 0.5mg subcutaneous injection once weekly for 4 weeks 1 mL 0     finasteride (PROSCAR) 5 MG tablet 1/2 TABLET DAILY BY MOUTH       PARoxetine (PAXIL) 30 MG tablet Take 1 tablet (30 mg) by mouth every morning. 90 tablet 3     psyllium (METAMUCIL/KONSYL) 58.6 % powder Take 1 teaspoonful by mouth 2 times daily.         Allergies   Allergen Reactions     Erythromycin Base [Erythromycin] Nausea and Vomiting     Sulfa (Sulfonamide Antibiotics) [Sulfa Antibiotics] Swelling       Review of Systems:   10 point ROS negative other than what was listed in HPI    Physical Exam:  /82   Pulse 71   Temp 98.8  F (37.1  C) (Tympanic)   Ht 1.626 m (5' 4\")   Wt 87.5 kg (193 lb)   SpO2 96%   BMI 33.13 kg/m      Constitutional- No acute distress, well nourished, non-toxic  Eyes: Anicteric, no injection.  PERRL  ENT:  Normocephalic, atraumatic, Nose midline, moist mucus membranes  Neck - supple, no LAD  Respiratory- Nonlabored breathing on room air  Cardiovascular - Extremities warm and well perfused  Abdomen - Soft, non-tender  Rectal - external exam with enlarged, nonthrombosed right anterior quadrant external " hemorrhoid. Perianal excoriations present. Digital rectal exam with good tone, no masses, guaiac negative. Anoscope exam showed enlarged internal hemorrhoids in right anterior and right posterior quadrant, non-bleeding  Neuro - No focal neuro deficits, Alert and oriented x 3  Psych: Appropriate mood and affect  Musculoskeletal: Normal gait, symmetric strength.  FROM upper and lower extremities.  Skin: Warm, Dry    Assessment:  1. Steve Aggarwal is a 54 year old female who presents to surgery clinic for evaluation of anal pain and bleeding. These symptoms appear to be consistent with symptomatic internal and external hemorrhoids. They are Grade III based on described symptoms. Explained to the patient the pathophysiology of hemorrhoids and that these are natural collections of submucosal sinusoids within the anorectum.  Symptomatic hemorrhoids can come from chronic irritation that causes vascular congestion and enlargement of hemorrhoids.      I counseled the patient on therapeutic options and explained that, although surgical excision or dearterialization is an option, I recommend starting with nonoperative management. She has infrequent bowel movements with residual stool afterwards, causing perianal excoriations which is likely contributing to symptoms. This has persisted despite trying to increase her fiber intake (takes one supplement daily). I recommend increasing fiber intake to 30 grams daily and continuing water intake of 2-3 L daily. I also recommend daily sitz baths.  In addition, decreasing alcohol and spicy food intake may also help. I also recommend meeting with a gastroenterologist to help determine if there is any other etiology as to why her bowel habits are inconsistent despite increasing fiber intake. We will trial this nonoperative approach for 3 months, and then invite patient back to clinic to reevaluate symptoms.  If still symptomatic at that time, then we can further discuss surgical  options for management of the hemorrhoids.  Patient expressed understanding and was amenable to the proposed plan.       Plan:   1. Medical management of hemorrhoids with increased fiber and water intake  2. Daily sitz baths  3. Meet with gastroenterology to evaluate inconsistent bowel habits  4. Return to surgery clinic in 3 months for recheck of hemorrhoids, possible banding        Manuel Dunn MD  12/9/2024 at 1:44 PM      Again, thank you for allowing me to participate in the care of your patient.        Sincerely,        Manuel Dunn MD

## 2024-12-16 NOTE — TELEPHONE ENCOUNTER
Diagnosis, Referred by & from: Hemorrhoids   Appt date: 2025   NOTES STATUS DETAILS   OFFICE NOTE from referring provider [facility] - [place] - SURG OV with Dr. Dunn   OFFICE NOTE from other specialist Care Everywhere / [facility]:  10/12/23 - GEN SURG OV with Dr. Foy  23 - PCC OV with Juice Roberts NP    [facility]:  23 - PCC OV with Dr. Gerard  22 - OBGYN OV with Dr. Quiroz  20 - PCC OV with Dr. Collado   DISCHARGE SUMMARY from hospital N/A    DISCHARGE REPORT from the ER N/A    OPERATIVE REPORT N/A    MEDICATION LIST Internal    LABS     ANAL PAP/CEA N/A    BIOPSIES/PATHOLOGY RELATED TO DIAGNOSIS Received [facility]:  21 - Colon Biopsy (Case: [id number])   DIAGNOSTIC PROCEDURES     PFC TESTING (from the Pelvic floor center includes Manometry, PDNL, EMG, etc.) N/A    COLONOSCOPY (most recent all time after 5 years) Received [facility]:  21 - Colonoscopy   FLEX SIGMOIDOSCOPY N/A    UPPER ENDOSCOPY (EGD) N/A    ERCP N/A    IMAGING (DISC & REPORT)      CT Received [facility]:  23 - CT Abd/Pelvis   MRI N/A    XRAY Received [facility]:  23 - XR Abdomen  3/31/21 - XR Abdomen   ULTRASOUND  (ENDOANAL/ENDORECTAL) Received [facility]:  2/10/23 - US Abdomen/Gallbladder     Records Requested    Facility  [place]   Outcome * 24 8:52 AM Records received from [facility] and sent to [facility] to be scanned into the chart. - Carly

## 2024-12-30 ENCOUNTER — MYC MEDICAL ADVICE (OUTPATIENT)
Dept: ENDOCRINOLOGY | Facility: CLINIC | Age: 54
End: 2024-12-30
Payer: COMMERCIAL

## 2024-12-30 DIAGNOSIS — E66.01 CLASS 2 SEVERE OBESITY WITH SERIOUS COMORBIDITY AND BODY MASS INDEX (BMI) OF 36.0 TO 36.9 IN ADULT, UNSPECIFIED OBESITY TYPE (H): Primary | ICD-10-CM

## 2024-12-30 DIAGNOSIS — E66.812 CLASS 2 SEVERE OBESITY WITH SERIOUS COMORBIDITY AND BODY MASS INDEX (BMI) OF 36.0 TO 36.9 IN ADULT, UNSPECIFIED OBESITY TYPE (H): Primary | ICD-10-CM

## 2024-12-31 ENCOUNTER — TELEPHONE (OUTPATIENT)
Dept: ENDOCRINOLOGY | Facility: CLINIC | Age: 54
End: 2024-12-31
Payer: COMMERCIAL

## 2024-12-31 RX ORDER — SEMAGLUTIDE 1 MG/.5ML
1 INJECTION, SOLUTION SUBCUTANEOUS WEEKLY
Qty: 6 ML | Refills: 0 | Status: SHIPPED | OUTPATIENT
Start: 2024-12-31

## 2024-12-31 NOTE — TELEPHONE ENCOUNTER
PA Initiation    Medication: WEGOVY 1 MG/0.5ML SC SOAJ  Insurance Company: [employer] Non-Specialty PA's - Phone [phone] Fax [phone]  Pharmacy Filling the Rx:    Filling Pharmacy Phone:    Filling Pharmacy Fax:    Start Date: 12/31/2024

## 2024-12-31 NOTE — TELEPHONE ENCOUNTER
PRIOR AUTHORIZATION DENIED    Medication: WEGOVY 1 MG/0.5ML SC SOAJ  Insurance Company: [employer]Specialty PA's - Phone [phone] Fax [phone]  Denial Date: 12/31/2024  Denial Reason(s):   Appeal Information:   Patient Notified: yes

## 2025-02-12 PROBLEM — M54.12 CERVICAL RADICULOPATHY: Status: RESOLVED | Noted: 2024-08-15 | Resolved: 2025-02-12

## 2025-02-17 ENCOUNTER — PRE VISIT (OUTPATIENT)
Dept: SURGERY | Facility: CLINIC | Age: 55
End: 2025-02-17

## 2025-04-18 ENCOUNTER — MYC MEDICAL ADVICE (OUTPATIENT)
Dept: ENDOCRINOLOGY | Facility: CLINIC | Age: 55
End: 2025-04-18
Payer: COMMERCIAL

## 2025-04-18 DIAGNOSIS — E66.01 CLASS 2 SEVERE OBESITY WITH SERIOUS COMORBIDITY AND BODY MASS INDEX (BMI) OF 36.0 TO 36.9 IN ADULT, UNSPECIFIED OBESITY TYPE (H): Primary | ICD-10-CM

## 2025-04-18 DIAGNOSIS — E66.812 CLASS 2 SEVERE OBESITY WITH SERIOUS COMORBIDITY AND BODY MASS INDEX (BMI) OF 36.0 TO 36.9 IN ADULT, UNSPECIFIED OBESITY TYPE (H): Primary | ICD-10-CM

## 2025-04-21 DIAGNOSIS — E66.01 CLASS 2 SEVERE OBESITY WITH SERIOUS COMORBIDITY AND BODY MASS INDEX (BMI) OF 36.0 TO 36.9 IN ADULT, UNSPECIFIED OBESITY TYPE (H): ICD-10-CM

## 2025-04-21 DIAGNOSIS — E66.812 CLASS 2 SEVERE OBESITY WITH SERIOUS COMORBIDITY AND BODY MASS INDEX (BMI) OF 36.0 TO 36.9 IN ADULT, UNSPECIFIED OBESITY TYPE (H): ICD-10-CM

## 2025-04-21 NOTE — TELEPHONE ENCOUNTER
Compound Semaglutide at [facility]  [facility] is offering compounded semaglutide during the time of Wegovy/Ozempic national shortage. Semaglutide is the generic name of Wegovy (for Weight Management) and Ozempic (for Type 2 Diabetes). [facility] is following the highest standards for sterility and compounding practices. Compounding of semaglutide is legal for as long as Wegovy/Ozempic are on the FDA's drug shortage list. When Wegovy/Ozempic are taken off the FDA's shortage list, compounding semaglutide will no longer be available/legal. Pharmacy can provide 1 last refill up to 1 month from resolution of shortage date on FDA drug shortage list. When this occurs, patients will have to turn to acquiring Wegovy via its available manufactured pen, look into alternative weight loss medication(s), or stop the medication. Due to high demand of compound semaglutide, orders may take 1-2 weeks to obtain from time of prescribing.     As with any weight loss medication(s), there is a risk of weight regain should you stop semaglutide. It is important to be aware of this risk should you stop compounded semaglutide with no plans to transition back to an alternative injectable option. The use of semaglutide as a weight management medication, is intended for long term use.     Wegovy shortage updates (as of 3/10/2025):     As of May 5, 2023, Wegovy was put on the FDA shortage list. During this time compound semaglutide was avialable at many compounding facilities including [facility].     March 10, 2025 Wegovy was taken off the FDA shortage list. Therefore, the FDA has enforced the policy that compounding of semaglutide will be discontinued as of April 22, 2025 at 503A compounding facilities, such as [facility], and May 22, 2025 for 503B outsourcing facilities. Most compounding pharmacies are considered 503A pharmacies.     Obtaining Medication From  "Pharmacy:   Automated call will contact patient when pharmacy has received RX. Patient must call the Compounding Pharmacy back to speak directly to team member prior to shipping medication to verify patient name, product, shipping, and cost. During this call, pharmacy technician will verify if needles/syringes will be needed and can be added to order for $5 additional cost per 10 unit syringe packs. Vials of compounded semaglutide [facility] your home in a refrigerated box. The vial you will be given is a multi-use vial, meaning that you will use the same vial during the next 28 days for each of your injections. Use a new syringe for each injection. The syringe that will be used to draw up your dose is a \"unit\" syringe, meaning your dose will have an associated \"mg\" and \"units\". Please see your prescription and the below information to verify the dose you should be injecting in UNITS prior to injection.     Storage:  Keep your medication stored in the refrigerator. The vials are to be discarded 28 days after opening (even if there is remaining medication in the vial).     Do not pre-fill syringes from the multi-use vial for future use. Sterility cannot be verified. You should only be drawing up the amount needed for the injection that day, then placing vial back into refrigerator for storage for next injection.     Common Side Effects:  Side effect profile is the same as Wegovy. Monitor for nausea, diarrhea, constipation, headache, indigestion, tiredness (fatigue), stomach upset or abdominal pain. Less commonly, semaglutide can cause low blood sugar (symptoms: shaky, dizzy, sweaty, agitation). Please reach out to the care team should you feel like this is occurring. It is important to ensure that you are eating consistent meals and not skipping meals. Ensure you are getting at least 64 oz water daily. If any side effects become unmanageable, contact the care team immediately. See " "Wegovy package insert for rare but serious side effects, contraindications and warnings.     Dosing:  Every 4 weeks you will have the opportunity/option to increase your dose. However, therapy is individualized for each patient. You should discuss any potential dose changes with your provider first.     Doses available for compound semaglutide: 0.25 mg, 0.5 mg, 1 mg, 1.5 mg, 1.7 mg, 2 mg, 2.4 mg and 2.5 mg.     As previously stated, you will draw up the associated \"unit\" to the your \"mg\" dose prescribed. Please see your provider's recommendations, your prescription and the below table to verify the number of \"units\" you should be drawing up in syringe for your dose. Example, if you are prescribed compound semaglutide 0.5 mg, you will draw up 10 units in the syringe. Use a new syringe for each injection.     *If you are having nausea or other side effects to where you are hesitant to move up to the next dose, stay at the same dose you are on for an additional 2-4 weeks to see if side effect(s) improve/resolve. Make sure to take this time to hydrate and utilizing smaller more consistent meals, such as 4-6 small meals per day.  It may be advantageous to stay at the same dose if you are seeing good efficacy (both on and off the scale) and having minimal/manageable side effects. If you do not have additional refills on that dose's prescription, please reach out to the clinic.    Mg to Unit Conversion Chart for Reference:         Administration:  Follow the instructions to fill the syringe with medicine. Instructions can be accessed at [email or web address] or by scanning this QR code-    Follow the instructions to inject your medication. Instructions can be accessed at [email or web address]  or by scanning this QR code-      Syringe Disposal:   Place the used syringe in a sharps container. You can buy a sharps container at your local pharmacy.   If you don't have a sharps container, you can use a plastic " detergent container with a lid.   Visit Learning About Safe Needle Use and Disposal (3Nodwise.net) and safeneedledisposal.org for more information.     Cost:   $230 per month for doses 1 mg or less (one 1 mL vial), $370 per month for doses higher than 1 mg (two 1 mL vials)   Optional $5 per 10 pack unit needle/syringe, no additional RX required    [facility] Phone:  [phone]    States to which [facility] is able to ship to: [place], [place], [place], [place], [place], [place]

## 2025-04-23 NOTE — TELEPHONE ENCOUNTER
Clinical Pharmacy Note    Compounded semaglutide no longer available at [facility]. This request appears to be duplicate to that signed by Yuly Don PA-C 4/21/25.    These were ordered as controlled substance incorrectly by previous user in system. Medication Therapy Management not able to refuse -will route to provider to refuse given duplicate.    Ivis Nolan, Pharm D., MPH    Medication Therapy Management Pharmacist   [facility]

## 2025-06-18 ENCOUNTER — TELEPHONE (OUTPATIENT)
Dept: ENDOCRINOLOGY | Facility: CLINIC | Age: 55
End: 2025-06-18

## 2025-06-18 NOTE — TELEPHONE ENCOUNTER
Patient confirmed scheduled appointment:     Date: 10/20/25  Time: 3:30 PM  Visit type: Return Weight Management  Visit mode: in-person  Provider:  Yuly Don PA-C  Location: [facility]    Additional Notes:     Pt changed insurance 2 weeks ago, no card or numbers yet.  She'll up load once the card arrives.

## 2025-07-01 ENCOUNTER — OFFICE VISIT (OUTPATIENT)
Dept: ORTHOPEDICS | Facility: CLINIC | Age: 55
End: 2025-07-01
Payer: COMMERCIAL

## 2025-07-01 ENCOUNTER — ANCILLARY PROCEDURE (OUTPATIENT)
Dept: GENERAL RADIOLOGY | Facility: CLINIC | Age: 55
End: 2025-07-01
Attending: STUDENT IN AN ORGANIZED HEALTH CARE EDUCATION/TRAINING PROGRAM
Payer: COMMERCIAL

## 2025-07-01 DIAGNOSIS — M79.672 ACUTE PAIN OF LEFT FOOT: Primary | ICD-10-CM

## 2025-07-01 DIAGNOSIS — M79.672 ACUTE PAIN OF LEFT FOOT: ICD-10-CM

## 2025-07-01 PROCEDURE — 73630 X-RAY EXAM OF FOOT: CPT | Mod: TC | Performed by: RADIOLOGY

## 2025-07-01 PROCEDURE — 99214 OFFICE O/P EST MOD 30 MIN: CPT | Performed by: STUDENT IN AN ORGANIZED HEALTH CARE EDUCATION/TRAINING PROGRAM

## 2025-07-01 RX ORDER — NAPROXEN 500 MG/1
500 TABLET ORAL 2 TIMES DAILY WITH MEALS
Qty: 60 TABLET | Refills: 0 | Status: SHIPPED | OUTPATIENT
Start: 2025-07-01

## 2025-07-01 NOTE — LETTER
7/1/2025      Steve Aggarwal  [address]  [facility] [address]      Dear Colleague,    Thank you for referring your patient, Steve Aggarwal, to the [facility] [place]. Please see a copy of my visit note below.    ASSESSMENT & PLAN    Steve was seen today for pain.    Diagnoses and all orders for this visit:    Acute pain of left foot  -     XR Foot Left G/E 3 Views; Future  -     naproxen (NAPROSYN) 500 MG tablet; Take 1 tablet (500 mg) by mouth 2 times daily (with meals).      This issue is acute and Unchanged. Steve presents our clinic today to discuss her acute left foot pain.  She reports 3 weeks of insidious pain through the dorsal aspect of the 1st through 3rd MTP joint that is worse with extended walking or biking.  Radiographs taken in clinic today were reviewed with the patient and were unremarkable for any degenerative changes at these joints as well as any acute abnormalities.  On examination, she is tender to palpation over the MCP joints and proximal phalanx of the 2nd and 3rd toe but otherwise has minimal overlying swelling, good range of motion, no pain with resisted muscle testing.  Overall, these findings seem most consistent with mechanical foot pain, as she has no history to suggest a stress injury.  We discussed beginning with conservative management the form of anti-inflammatory medicines and activity modifications in the short-term to allow the area to settle before beginning gradual return to activity we determined the following plan:  - Naprosyn 500 mg twice daily for 7 days, as needed afterwards sent to pharmacy  - She will modify her activities based on pain over the next 1 to 2 weeks, afterwards that she can gradually return to normal activity  - She will follow-up with me in 2 to 4 weeks if not improving, at that time would likely proceed with advanced imaging of the foot    Jayme Stephen,   [facility]  [place]    -----  Chief Complaint   Patient presents with     Left Foot - Pain       SUBJECTIVE  Steve Aggarwal is a/an 55 year old female who is seen as a self referral for evaluation of left foot.     The patient is seen by themselves.    Onset: 3 week(s) ago. Reports insidious onset without acute precipitating event.  Location of Pain: left dorsal foot over 1st-3rd MTP joints - 2nd is worse  Worsened by: biking, walking  Better with: rest  Treatments tried: rest/activity avoidance, ice, heat, Aleve, and biofreeze, acupuncture  Associated symptoms: swelling - has improved    Orthopedic/Surgical history: YES - h/o left foot fractures in childhood - not surgical  Social History/Occupation: school counselor      REVIEW OF SYSTEMS:  Review of systems negative unless mentioned in HPI     OBJECTIVE:  There were no vitals taken for this visit.   General: healthy, alert and in no distress  Skin: no suspicious lesions or rash.  CV: distal perfusion intact   Resp: normal respiratory effort without conversational dyspnea   Psych: normal mood and affect  Gait: NORMAL  Neuro: Normal light sensory exam of LL extremity     Left Foot and Ankle exam  Gait: Normal  Alignment: Normal  Inspection: [x] Normal  [] Swelling present  [] Ecchymosis present []Other   Tenderness: TTP over the MCP, proximal phalanx of 2nd, 3rd, toe. No other bony tenderness of forefoot, midfoot, hindfoot noted. No tenderness over either malleolus.   Range of motion (ankle):   Plantarflexion:Full Dorsiflexion: Full  Inversion: Full  Eversion: Full  Strength:   Plantarflexion: 5/5 Dorsiflexion: 5/5  Internal rotation: 5/5 External rotation 5/5  Neurologic: Normal sensation   Vascular: Normal pulses   Special tests:   Frost: Negative  Syndesmotic squeeze test: Negative  Anterior drawer: Negative  Dorsiflexion/eversion: No pain   Talar tilt: Negative    Calcaneal squeeze: Negative    Other tests: None  Contralateral foot and ankle grossly normal in  terms of appearance, range of motion, and strength       RADIOLOGY:  Final results and radiologist's interpretation, available in the [facility] health record.  Images were reviewed with the patient in the office today.  My personal interpretation of the performed imaging: Normal joint spacing and alignment of the foot and ankle.  No acute fractures or bony abnormalities.          Again, thank you for allowing me to participate in the care of your patient.        Sincerely,        Jayme Stephen, DO    Electronically signed

## 2025-07-01 NOTE — PROGRESS NOTES
ASSESSMENT & PLAN    Steve was seen today for pain.    Diagnoses and all orders for this visit:    Acute pain of left foot  -     XR Foot Left G/E 3 Views; Future  -     naproxen (NAPROSYN) 500 MG tablet; Take 1 tablet (500 mg) by mouth 2 times daily (with meals).      This issue is acute and Unchanged. Steve presents our clinic today to discuss her acute left foot pain.  She reports 3 weeks of insidious pain through the dorsal aspect of the 1st through 3rd MTP joint that is worse with extended walking or biking.  Radiographs taken in clinic today were reviewed with the patient and were unremarkable for any degenerative changes at these joints as well as any acute abnormalities.  On examination, she is tender to palpation over the MCP joints and proximal phalanx of the 2nd and 3rd toe but otherwise has minimal overlying swelling, good range of motion, no pain with resisted muscle testing.  Overall, these findings seem most consistent with mechanical foot pain, as she has no history to suggest a stress injury.  We discussed beginning with conservative management the form of anti-inflammatory medicines and activity modifications in the short-term to allow the area to settle before beginning gradual return to activity we determined the following plan:  - Naprosyn 500 mg twice daily for 7 days, as needed afterwards sent to pharmacy  - She will modify her activities based on pain over the next 1 to 2 weeks, afterwards that she can gradually return to normal activity  - She will follow-up with me in 2 to 4 weeks if not improving, at that time would likely proceed with advanced imaging of the foot    Jayme Stephen DO  [facility] [place]    -----  Chief Complaint   Patient presents with    Left Foot - Pain       SUBJECTIVE  Steve Aggarwal is a/an 55 year old female who is seen as a self referral for evaluation of left foot.     The patient is seen by themselves.    Onset: 3 week(s)  ago. Reports insidious onset without acute precipitating event.  Location of Pain: left dorsal foot over 1st-3rd MTP joints - 2nd is worse  Worsened by: biking, walking  Better with: rest  Treatments tried: rest/activity avoidance, ice, heat, Aleve, and biofreeze, acupuncture  Associated symptoms: swelling - has improved    Orthopedic/Surgical history: YES - h/o left foot fractures in childhood - not surgical  Social History/Occupation: school counselor      REVIEW OF SYSTEMS:  Review of systems negative unless mentioned in HPI     OBJECTIVE:  There were no vitals taken for this visit.   General: healthy, alert and in no distress  Skin: no suspicious lesions or rash.  CV: distal perfusion intact   Resp: normal respiratory effort without conversational dyspnea   Psych: normal mood and affect  Gait: NORMAL  Neuro: Normal light sensory exam of LL extremity     Left Foot and Ankle exam  Gait: Normal  Alignment: Normal  Inspection: [x] Normal  [] Swelling present  [] Ecchymosis present []Other   Tenderness: TTP over the MCP, proximal phalanx of 2nd, 3rd, toe. No other bony tenderness of forefoot, midfoot, hindfoot noted. No tenderness over either malleolus.   Range of motion (ankle):   Plantarflexion:Full Dorsiflexion: Full  Inversion: Full  Eversion: Full  Strength:   Plantarflexion: 5/5 Dorsiflexion: 5/5  Internal rotation: 5/5 External rotation 5/5  Neurologic: Normal sensation   Vascular: Normal pulses   Special tests:   Frost: Negative  Syndesmotic squeeze test: Negative  Anterior drawer: Negative  Dorsiflexion/eversion: No pain   Talar tilt: Negative    Calcaneal squeeze: Negative    Other tests: None  Contralateral foot and ankle grossly normal in terms of appearance, range of motion, and strength       RADIOLOGY:  Final results and radiologist's interpretation, available in the [facility] health record.  Images were reviewed with the patient in the office today.  My personal interpretation of the performed imaging:  Normal joint spacing and alignment of the foot and ankle.  No acute fractures or bony abnormalities.

## 2025-08-06 ENCOUNTER — ANCILLARY PROCEDURE (OUTPATIENT)
Dept: MAMMOGRAPHY | Facility: CLINIC | Age: 55
End: 2025-08-06
Payer: COMMERCIAL

## 2025-08-06 DIAGNOSIS — Z12.31 VISIT FOR SCREENING MAMMOGRAM: ICD-10-CM

## 2025-08-06 PROCEDURE — 77063 BREAST TOMOSYNTHESIS BI: CPT | Mod: TC | Performed by: RADIOLOGY

## 2025-08-06 PROCEDURE — 77067 SCR MAMMO BI INCL CAD: CPT | Mod: TC | Performed by: RADIOLOGY

## 2025-09-04 ENCOUNTER — VIRTUAL VISIT (OUTPATIENT)
Dept: ENDOCRINOLOGY | Facility: CLINIC | Age: 55
End: 2025-09-04
Payer: COMMERCIAL

## 2025-09-04 VITALS — WEIGHT: 200 LBS | HEIGHT: 64 IN | BODY MASS INDEX: 34.15 KG/M2

## 2025-09-04 DIAGNOSIS — G47.33 OBSTRUCTIVE SLEEP APNEA: ICD-10-CM

## 2025-09-04 DIAGNOSIS — R73.03 PRE-DIABETES: ICD-10-CM

## 2025-09-04 DIAGNOSIS — E66.01 CLASS 2 SEVERE OBESITY WITH SERIOUS COMORBIDITY AND BODY MASS INDEX (BMI) OF 36.0 TO 36.9 IN ADULT, UNSPECIFIED OBESITY TYPE (H): Primary | ICD-10-CM

## 2025-09-04 DIAGNOSIS — E66.812 CLASS 2 SEVERE OBESITY WITH SERIOUS COMORBIDITY AND BODY MASS INDEX (BMI) OF 36.0 TO 36.9 IN ADULT, UNSPECIFIED OBESITY TYPE (H): Primary | ICD-10-CM

## 2025-09-04 ASSESSMENT — PAIN SCALES - GENERAL: PAINLEVEL_OUTOF10: NO PAIN (0)
